# Patient Record
Sex: MALE | Race: WHITE | NOT HISPANIC OR LATINO | Employment: STUDENT | URBAN - METROPOLITAN AREA
[De-identification: names, ages, dates, MRNs, and addresses within clinical notes are randomized per-mention and may not be internally consistent; named-entity substitution may affect disease eponyms.]

---
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
## 2018-07-24 ENCOUNTER — OFFICE VISIT (OUTPATIENT)
Dept: INTERNAL MEDICINE | Facility: CLINIC | Age: 18
End: 2018-07-24
Payer: COMMERCIAL

## 2018-07-24 VITALS
RESPIRATION RATE: 18 BRPM | TEMPERATURE: 98 F | WEIGHT: 199.94 LBS | DIASTOLIC BLOOD PRESSURE: 60 MMHG | HEIGHT: 76 IN | OXYGEN SATURATION: 98 % | SYSTOLIC BLOOD PRESSURE: 116 MMHG | HEART RATE: 60 BPM | BODY MASS INDEX: 24.35 KG/M2

## 2018-07-24 DIAGNOSIS — Z00.00 ANNUAL PHYSICAL EXAM: Primary | ICD-10-CM

## 2018-07-24 DIAGNOSIS — I34.1 MITRAL VALVE PROLAPSE: ICD-10-CM

## 2018-07-24 DIAGNOSIS — Q24.9 ADULT CONGENITAL HEART DISEASE: ICD-10-CM

## 2018-07-24 DIAGNOSIS — Q87.40 MARFAN SYNDROME: ICD-10-CM

## 2018-07-24 PROCEDURE — 99999 PR PBB SHADOW E&M-EST. PATIENT-LVL III: CPT | Mod: PBBFAC,,, | Performed by: INTERNAL MEDICINE

## 2018-07-24 PROCEDURE — 99385 PREV VISIT NEW AGE 18-39: CPT | Mod: S$GLB,,, | Performed by: INTERNAL MEDICINE

## 2018-07-24 NOTE — PROGRESS NOTES
Subjective:       Patient ID: Rodrigo Lopez is a 18 y.o. male.    Chief Complaint: Annual Exam    HPI   18 y.o. Male here for annual exam.     Cholesterol: (needs)  Vaccines: Influenza (declined); Tetanus (2011)  Sexual Screening: declined  Eye exam: 2017    Exercise: cardio/weights 6x/wk  Diet: regular    Past Medical History:  No date: Arachnodactyly  No date: Congenital heart problem  No date: Osteochondritis dessicans  No date: Pectus carinatum  No date: Scoliosis  Past Surgical History:  No date: TYMPANOSTOMY TUBE PLACEMENT  Social History    Marital status: Single              Spouse name:                       Years of education:                 Number of children:               Occupational History  Occupation          Employer            Comment               Student at [employer]                              Social History Main Topics    Smoking status: Never Smoker                                                                Smokeless tobacco: Never Used                        Alcohol use: Yes                Comment: social    Drug use: No              Sexual activity: Yes                  Social History Narrative    Parents .  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  Pledging a fraternity.    Review of patient's allergies indicates:   -- Pistachio nut -- Swelling   -- Cashew nut   Mr. Lopez had no medications administered during this visit.    Review of Systems   Constitutional: Negative for activity change, appetite change, chills, diaphoresis, fatigue, fever and unexpected weight change.   HENT: Negative for congestion, mouth sores, postnasal drip, rhinorrhea, sinus pressure, sneezing, sore throat, trouble swallowing and voice change.    Eyes: Negative for discharge, itching and visual disturbance.   Respiratory: Negative for cough, chest tightness, shortness of breath and wheezing.    Cardiovascular: Negative for chest pain, palpitations and leg swelling.   Gastrointestinal: Negative for  abdominal pain, blood in stool, constipation, diarrhea, nausea and vomiting.   Endocrine: Negative for cold intolerance and heat intolerance.   Genitourinary: Negative for difficulty urinating, dysuria, flank pain, hematuria and urgency.   Musculoskeletal: Negative for arthralgias, back pain, myalgias and neck pain.   Skin: Negative for rash and wound.   Allergic/Immunologic: Negative for environmental allergies and food allergies.   Neurological: Negative for dizziness, tremors, seizures, syncope, weakness and headaches.   Hematological: Negative for adenopathy. Does not bruise/bleed easily.   Psychiatric/Behavioral: Negative for confusion, sleep disturbance and suicidal ideas. The patient is not nervous/anxious.        Objective:      Physical Exam   Constitutional: He is oriented to person, place, and time. He appears well-developed and well-nourished. No distress.   HENT:   Head: Normocephalic and atraumatic.   Right Ear: External ear normal.   Left Ear: External ear normal.   Nose: Nose normal.   Mouth/Throat: Oropharynx is clear and moist. No oropharyngeal exudate.   Eyes: Conjunctivae and EOM are normal. Pupils are equal, round, and reactive to light. Right eye exhibits no discharge. Left eye exhibits no discharge. No scleral icterus.   Neck: Normal range of motion. Neck supple. No JVD present. No thyromegaly present.   Cardiovascular: Normal rate, regular rhythm and intact distal pulses.    Murmur heard.  Pulmonary/Chest: Effort normal and breath sounds normal. No respiratory distress. He has no wheezes. He has no rales.   Abdominal: Soft. Bowel sounds are normal. He exhibits no distension. There is no tenderness. There is no guarding.   Musculoskeletal: He exhibits no edema.   Lymphadenopathy:     He has no cervical adenopathy.   Neurological: He is alert and oriented to person, place, and time. No cranial nerve deficit. Coordination normal.   Skin: Skin is warm and dry. No rash noted. He is not  diaphoretic. No pallor.   Psychiatric: He has a normal mood and affect. Judgment normal.       Assessment:       1. Annual physical exam    2. Marfan syndrome    3. Adult congenital heart disease    4. Mitral valve prolapse        Plan:    1. Blood work ordered       Vaccines: Influenza (declined); Tetanus (2011)       Sexual Screening: declined       Eye exam: 2017   2. Stable   3/4. Stable, CPT

## 2018-07-25 ENCOUNTER — LAB VISIT (OUTPATIENT)
Dept: LAB | Facility: HOSPITAL | Age: 18
End: 2018-07-25
Attending: INTERNAL MEDICINE
Payer: COMMERCIAL

## 2018-07-25 DIAGNOSIS — Z00.00 ANNUAL PHYSICAL EXAM: ICD-10-CM

## 2018-07-25 LAB
ALBUMIN SERPL BCP-MCNC: 3.9 G/DL
ALP SERPL-CCNC: 124 U/L
ALT SERPL W/O P-5'-P-CCNC: 20 U/L
ANION GAP SERPL CALC-SCNC: 6 MMOL/L
AST SERPL-CCNC: 28 U/L
BASOPHILS # BLD AUTO: 0.02 K/UL
BASOPHILS NFR BLD: 0.3 %
BILIRUB SERPL-MCNC: 0.4 MG/DL
BUN SERPL-MCNC: 23 MG/DL
CALCIUM SERPL-MCNC: 9.6 MG/DL
CHLORIDE SERPL-SCNC: 104 MMOL/L
CHOLEST SERPL-MCNC: 163 MG/DL
CHOLEST/HDLC SERPL: 4.2 {RATIO}
CO2 SERPL-SCNC: 28 MMOL/L
CREAT SERPL-MCNC: 1.1 MG/DL
DIFFERENTIAL METHOD: NORMAL
EOSINOPHIL # BLD AUTO: 0.2 K/UL
EOSINOPHIL NFR BLD: 2.5 %
ERYTHROCYTE [DISTWIDTH] IN BLOOD BY AUTOMATED COUNT: 12.5 %
EST. GFR  (AFRICAN AMERICAN): >60 ML/MIN/1.73 M^2
EST. GFR  (NON AFRICAN AMERICAN): >60 ML/MIN/1.73 M^2
GLUCOSE SERPL-MCNC: 93 MG/DL
HCT VFR BLD AUTO: 46 %
HDLC SERPL-MCNC: 39 MG/DL
HDLC SERPL: 23.9 %
HGB BLD-MCNC: 14.9 G/DL
IMM GRANULOCYTES # BLD AUTO: 0.02 K/UL
IMM GRANULOCYTES NFR BLD AUTO: 0.3 %
LDLC SERPL CALC-MCNC: 111 MG/DL
LYMPHOCYTES # BLD AUTO: 3 K/UL
LYMPHOCYTES NFR BLD: 41.9 %
MCH RBC QN AUTO: 28.8 PG
MCHC RBC AUTO-ENTMCNC: 32.4 G/DL
MCV RBC AUTO: 89 FL
MONOCYTES # BLD AUTO: 0.6 K/UL
MONOCYTES NFR BLD: 7.7 %
NEUTROPHILS # BLD AUTO: 3.4 K/UL
NEUTROPHILS NFR BLD: 47.3 %
NONHDLC SERPL-MCNC: 124 MG/DL
NRBC BLD-RTO: 0 /100 WBC
PLATELET # BLD AUTO: 275 K/UL
PMV BLD AUTO: 10.4 FL
POTASSIUM SERPL-SCNC: 5.1 MMOL/L
PROT SERPL-MCNC: 7.5 G/DL
RBC # BLD AUTO: 5.18 M/UL
SODIUM SERPL-SCNC: 138 MMOL/L
TRIGL SERPL-MCNC: 65 MG/DL
TSH SERPL DL<=0.005 MIU/L-ACNC: 1.95 UIU/ML
WBC # BLD AUTO: 7.16 K/UL

## 2018-07-25 PROCEDURE — 86735 MUMPS ANTIBODY: CPT

## 2018-07-25 PROCEDURE — 80061 LIPID PANEL: CPT

## 2018-07-25 PROCEDURE — 86765 RUBEOLA ANTIBODY: CPT

## 2018-07-25 PROCEDURE — 86762 RUBELLA ANTIBODY: CPT

## 2018-07-25 PROCEDURE — 80053 COMPREHEN METABOLIC PANEL: CPT

## 2018-07-25 PROCEDURE — 84443 ASSAY THYROID STIM HORMONE: CPT

## 2018-07-25 PROCEDURE — 36415 COLL VENOUS BLD VENIPUNCTURE: CPT | Mod: PO

## 2018-07-25 PROCEDURE — 85025 COMPLETE CBC W/AUTO DIFF WBC: CPT

## 2018-07-26 LAB
MUMPS IGG INTERPRETATION: NEGATIVE
MUMPS IGG SCREEN: 0.74 ISR
RUBEOLA IGG ANTIBODY: 0.82 ISR
RUBEOLA INTERPRETATION: NEGATIVE
RUBV IGG SER-ACNC: 7.8 IU/ML
RUBV IGG SER-IMP: ABNORMAL

## 2018-07-27 ENCOUNTER — TELEPHONE (OUTPATIENT)
Dept: INTERNAL MEDICINE | Facility: CLINIC | Age: 18
End: 2018-07-27

## 2018-07-27 DIAGNOSIS — Z00.00 ANNUAL PHYSICAL EXAM: Primary | ICD-10-CM

## 2018-07-27 NOTE — TELEPHONE ENCOUNTER
Spoke with pt re: he is not immune to MMR. Pt needs a booster .   Pt verbalized understanding & an appt has been made for it in the follow ing week. Pt is out of town next week.

## 2018-08-08 ENCOUNTER — CLINICAL SUPPORT (OUTPATIENT)
Dept: INTERNAL MEDICINE | Facility: CLINIC | Age: 18
End: 2018-08-08
Payer: COMMERCIAL

## 2018-08-08 PROCEDURE — 90707 MMR VACCINE SC: CPT | Mod: S$GLB,,, | Performed by: INTERNAL MEDICINE

## 2018-08-08 PROCEDURE — 90471 IMMUNIZATION ADMIN: CPT | Mod: S$GLB,,, | Performed by: INTERNAL MEDICINE

## 2018-08-08 NOTE — PROGRESS NOTES
Patient was given MMR booster, to left arm IM, tolerated well. Copy of immunization form given to patient.

## 2018-09-17 RX ORDER — METOPROLOL SUCCINATE 50 MG/1
TABLET, EXTENDED RELEASE ORAL
Qty: 30 TABLET | Refills: 11 | Status: SHIPPED | OUTPATIENT
Start: 2018-09-17 | End: 2019-10-10 | Stop reason: SDUPTHER

## 2019-03-11 DIAGNOSIS — Q24.9 ADULT CONGENITAL HEART DISEASE: Primary | ICD-10-CM

## 2019-03-11 DIAGNOSIS — I34.1 MITRAL VALVE PROLAPSE: ICD-10-CM

## 2019-03-27 ENCOUNTER — HOSPITAL ENCOUNTER (OUTPATIENT)
Dept: CARDIOLOGY | Facility: CLINIC | Age: 19
Discharge: HOME | End: 2019-03-27
Payer: COMMERCIAL

## 2019-03-27 ENCOUNTER — OFFICE VISIT (OUTPATIENT)
Dept: PEDIATRIC CARDIOLOGY | Facility: CLINIC | Age: 19
End: 2019-03-27
Payer: COMMERCIAL

## 2019-03-27 VITALS
WEIGHT: 198 LBS | OXYGEN SATURATION: 100 % | HEART RATE: 63 BPM | HEART RATE: 64 BPM | BODY MASS INDEX: 24.23 KG/M2 | DIASTOLIC BLOOD PRESSURE: 69 MMHG | HEIGHT: 76 IN | HEIGHT: 77 IN | SYSTOLIC BLOOD PRESSURE: 124 MMHG | BODY MASS INDEX: 23.38 KG/M2 | WEIGHT: 199 LBS

## 2019-03-27 DIAGNOSIS — I34.1 MITRAL VALVE PROLAPSE: ICD-10-CM

## 2019-03-27 DIAGNOSIS — Q87.40 MARFAN SYNDROME: Primary | ICD-10-CM

## 2019-03-27 DIAGNOSIS — Q24.9 ADULT CONGENITAL HEART DISEASE: ICD-10-CM

## 2019-03-27 LAB
ASCENDING AORTA: 2.28 CM
AV INDEX (PROSTH): 0.9
AV MEAN GRADIENT: 3.35 MMHG
AV PEAK GRADIENT: 5.02 MMHG
AV VALVE AREA: 3.48 CM2
AV VELOCITY RATIO: 0.92
BSA FOR ECHO PROCEDURE: 2.2 M2
CV ECHO LV RWT: 0.36 CM
DOP CALC AO PEAK VEL: 1.12 M/S
DOP CALC AO VTI: 22.58 CM
DOP CALC LVOT AREA: 3.87 CM2
DOP CALC LVOT DIAMETER: 2.22 CM
DOP CALC LVOT PEAK VEL: 1.03 M/S
DOP CALC LVOT STROKE VOLUME: 78.65 CM3
DOP CALCLVOT PEAK VEL VTI: 20.33 CM
E WAVE DECELERATION TIME: 262.18 MSEC
E/A RATIO: 2.05
E/E' RATIO: 5.71
ECHO LV POSTERIOR WALL: 0.9 CM (ref 0.6–1.1)
FRACTIONAL SHORTENING: 34 % (ref 28–44)
INTERVENTRICULAR SEPTUM: 0.73 CM (ref 0.6–1.1)
LA MAJOR: 4.76 CM
LA MINOR: 4.48 CM
LA WIDTH: 3.48 CM
LEFT ATRIUM SIZE: 3.1 CM
LEFT ATRIUM VOLUME INDEX: 19.1 ML/M2
LEFT ATRIUM VOLUME: 42.33 CM3
LEFT INTERNAL DIMENSION IN SYSTOLE: 3.28 CM (ref 2.1–4)
LEFT VENTRICLE DIASTOLIC VOLUME INDEX: 53.07 ML/M2
LEFT VENTRICLE DIASTOLIC VOLUME: 117.34 ML
LEFT VENTRICLE MASS INDEX: 62.5 G/M2
LEFT VENTRICLE SYSTOLIC VOLUME INDEX: 19.6 ML/M2
LEFT VENTRICLE SYSTOLIC VOLUME: 43.39 ML
LEFT VENTRICULAR INTERNAL DIMENSION IN DIASTOLE: 4.98 CM (ref 3.5–6)
LEFT VENTRICULAR MASS: 138.13 G
LV LATERAL E/E' RATIO: 5.71
LV SEPTAL E/E' RATIO: 5.71
MV PEAK A VEL: 0.39 M/S
MV PEAK E VEL: 0.8 M/S
PISA TR MAX VEL: 1.95 M/S
PV PEAK VELOCITY: 0.89 CM/S
RA MAJOR: 5.48 CM
RA WIDTH: 4.79 CM
RIGHT VENTRICULAR END-DIASTOLIC DIMENSION: 3.52 CM
RV TISSUE DOPPLER FREE WALL SYSTOLIC VELOCITY 1 (APICAL 4 CHAMBER VIEW): 11.85 M/S
SINUS: 3.37 CM
STJ: 2.49 CM
TDI LATERAL: 0.14
TDI SEPTAL: 0.14
TDI: 0.14
TR MAX PG: 15.21 MMHG
TRICUSPID ANNULAR PLANE SYSTOLIC EXCURSION: 2.95 CM

## 2019-03-27 PROCEDURE — 3008F PR BODY MASS INDEX (BMI) DOCUMENTED: ICD-10-PCS | Mod: CPTII,S$GLB,, | Performed by: PEDIATRICS

## 2019-03-27 PROCEDURE — 3008F BODY MASS INDEX DOCD: CPT | Mod: CPTII,S$GLB,, | Performed by: PEDIATRICS

## 2019-03-27 PROCEDURE — 99214 OFFICE O/P EST MOD 30 MIN: CPT | Mod: 25,S$GLB,, | Performed by: PEDIATRICS

## 2019-03-27 PROCEDURE — 99214 PR OFFICE/OUTPT VISIT, EST, LEVL IV, 30-39 MIN: ICD-10-PCS | Mod: 25,S$GLB,, | Performed by: PEDIATRICS

## 2019-03-27 PROCEDURE — 93303 ECHO TRANSTHORACIC: CPT | Mod: S$GLB,,, | Performed by: PEDIATRICS

## 2019-03-27 PROCEDURE — 93000 EKG 12-LEAD: ICD-10-PCS | Mod: S$GLB,,, | Performed by: INTERNAL MEDICINE

## 2019-03-27 PROCEDURE — 93000 ELECTROCARDIOGRAM COMPLETE: CPT | Mod: S$GLB,,, | Performed by: INTERNAL MEDICINE

## 2019-03-27 PROCEDURE — 99999 PR PBB SHADOW E&M-EST. PATIENT-LVL III: ICD-10-PCS | Mod: PBBFAC,,, | Performed by: PEDIATRICS

## 2019-03-27 PROCEDURE — 93303 TRANSTHORACIC ECHO (TTE) COMPLETE (CUPID ONLY): ICD-10-PCS | Mod: S$GLB,,, | Performed by: PEDIATRICS

## 2019-03-27 PROCEDURE — 99999 PR PBB SHADOW E&M-EST. PATIENT-LVL III: CPT | Mod: PBBFAC,,, | Performed by: PEDIATRICS

## 2019-03-27 NOTE — PROGRESS NOTES
2019    Re:Rodrigo Lopez  :2000    Jono Tesfaye, DO   [facility] [place] [address]    [facility]    Dear Rasta:    It is a pleasure to see Rodrigo in follow up for his Marfan syndrome in the [facility].  The history was provided by the patient and his father.  Rodrigo Lopez is a 18 y.o. male diagnosed via genetic testing with Marfan syndrome.  He is asymptomatic from a cardiovascular standpoint without chest pain, palpitations, syncope, near-syncope, cyanosis, and edema.  He is no longer playing basketball and is well aware of the activity restrictions placed on patients with Marfan's.  He continues to work out regularly, lifting weights that he can comfortably lift 10 times and playing pickup basketball.  He has seen opthalmology, and his eyes are doing well.  He is tolerating his beta blocker.    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  FBN1 was positive for C271X pathogenic mutation consistent with Marfan syndrome consistent with Marfan syndrome.    Past Surgical History:   Procedure Laterality Date    TYMPANOSTOMY TUBE PLACEMENT       Family History   Problem Relation Age of Onset    Diabetes Maternal Grandmother     Depression Maternal Grandmother     Short stature Brother     Depression Mother     Depression Maternal Aunt     Diabetes Maternal Aunt     Congenital heart disease Neg Hx    There is no family history of aortic dissection, sudden death, or Marfan syndrome.  Social History     Socioeconomic History    Marital status: Single     Spouse name: None    Number of children: None    Years of education: None    Highest education level: None   Occupational History    Occupation: Student at [employer]   Social Needs    Financial  "resource strain: None    Food insecurity:     Worry: None     Inability: None    Transportation needs:     Medical: None     Non-medical: None   Tobacco Use    Smoking status: Never Smoker    Smokeless tobacco: Never Used   Substance and Sexual Activity    Alcohol use: Yes     Comment: social    Drug use: No    Sexual activity: Yes   Lifestyle    Physical activity:     Days per week: None     Minutes per session: None    Stress: None   Relationships    Social connections:     Talks on phone: None     Gets together: None     Attends Confucianism service: None     Active member of club or organization: None     Attends meetings of clubs or organizations: None     Relationship status: None    Intimate partner violence:     Fear of current or ex partner: None     Emotionally abused: None     Physically abused: None     Forced sexual activity: None   Other Topics Concern    None   Social History Narrative    Parents .  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  Pledging a fraternity.       Current Outpatient Medications:     metoprolol succinate (TOPROL-XL) 50 MG 24 hr tablet, TAKE 1 TABLET(50 MG) BY MOUTH EVERY DAY, Disp: 30 tablet, Rfl: 11  Allergies   Allergen Reactions    Cashew Nut      The review of systems is as noted above. It is otherwise negative for other symptoms related to the general, neurological, psychiatric, endocrine, gastrointestinal, genitourinary, respiratory, dermatologic, musculoskeletal, hematologic, and immunologic systems.    /69 (BP Location: Left arm, Patient Position: Sitting)   Pulse 64   Ht 6' 5" (1.956 m)   Wt 89.8 kg (197 lb 15.6 oz)   SpO2 100%   BMI 23.48 kg/m²   Wt Readings from Last 3 Encounters:   03/27/19 89.8 kg (197 lb 15.6 oz)   03/27/19 90.3 kg (199 lb)   07/24/18 90.7 kg (199 lb 15.3 oz)     Ht Readings from Last 3 Encounters:   03/27/19 6' 5" (1.956 m)   03/27/19 6' 4" (1.93 m)   07/24/18 6' 4" (1.93 m)     Body mass index is 23.48 " kg/m².  [unfilled]  Facility age limit for growth percentiles is 20 years.  Facility age limit for growth percentiles is 20 years.  In general, he is a tall thin white male in no apparent distress.  The head is normocephalic and atraumatic.  The eyes and ears are clear.  There is no significant teeth crowding but he does have a somewhat high arched palate.  The neck is supple.  There is no jugular venous distention.  The lungs are clear to auscultation bilaterally.  There may be a very mild scoliosis.  He has a very mild pectus.  The first and second heart sounds are normal.  A mid systolic click is appreciated.  However, there are no murmurs in the supine or standing position.  The abdominal exam is benign without hepatosplenomegaly, tenderness, or distention.  Pulses are normal in all extremities with brisk capillary refill and no clubbing, cyanosis, or edema.  He has arachnodactyly.  He has very long arms and legs.    I personally reviewed the following tests performed today and my interpretation follows:  EKG today is normal.  Rate of 64.    Echo today is normal except for borderline MVP with trivial MR.  The aortic root is not enlarged at all (3.4 at sinuses, ascending aorta around 2.4).    He had a cardiac MRI December 19, 2017:  On angiogram:  a. Sinuses of valsalva: [id number] (Z-score 0.7 via [email or web address])  b. Sinotubular junction: 24x23 mm (Z-score -0.57 Josie data)  c. Ascending aorta at the level of the RPA: [id number] (Z-score -0.42 Josie data)  d. Ascending aorta prior the right brachiocephalic: 22x21 mm  e. Proximal transverse arch: 29k55qi  f. Distal transverse arch: 53b22sk  g. Descending aortic arch just after the left subclavian (isthmus):05x55ri  h. Descending aorta at the left PA: 23x17 mm  i. Descending aorta at the diaphragm: 18x16 mm    Conclusion:    1. Normal LV volumes with LVEF of 56%  2. Normal RV volumes with RVEF of 38%  3. Trileaflet AV without significant AI  4. Normal size aorta   (measurements as described above).   5. Normal mitral valve    Diagnoses:  1.  Genetically confirmed Marfan syndrome  2.  Mitral valve prolapse without significant insufficiency  3.  No evidence of aortic root enlargement on echo today or MRI 12/2017    Discussion:  We once again had a long discussion about the potential cardiovascular problems associated with Marfan syndrome.  The most worrisome is the increased risk of progressive aortic root enlargement associated with aortic dissection and aortic rupture.  As of a year ago, his aortic root looked completely normal.  However, secondary to published recommendations, we started his beta blocker a few years ago, and he has tolerated this well.  Any chest pain should be evaluated immediately.  Although his risk for aortic dissection is quite low given the normal echocardiogram, he should be evaluated immediately with chest pain and had an echocardiogram or CT scan.    His mitral valve prolapse is quite mild and there is no insufficiency.  I think it is unlikely that he will ever require intervention on the mitral valve.    We again had a discussion about activity restriction related to Marfan syndrome.  Given the normal aorta, I have not restricted him from light weight lifting (able to lift greater than 10 reps, no straining), but we will likely need to restrict more in the future if we see any dilation of the aorta.    Plan:  1.  As above  2.  No SBEP needed.  3.  Continue metoprolol 50 mg daily.  4.  Follow up 1 year with echo and ekg.  Repeat MRI in 2 years.    Thank you for referring this patient to our clinic.  Please call with any questions.    Sincerely,        Gianni Salazar MD  Pediatric Cardiology  Adult Congenital Heart Disease  Pediatric Heart Failure and Transplantation  [facility] [address] [place]  [address]  [phone]

## 2019-10-10 RX ORDER — METOPROLOL SUCCINATE 50 MG/1
TABLET, EXTENDED RELEASE ORAL
Qty: 30 TABLET | Refills: 11 | Status: SHIPPED | OUTPATIENT
Start: 2019-10-10 | End: 2020-09-10 | Stop reason: SDUPTHER

## 2020-08-17 DIAGNOSIS — Q87.40 MARFAN SYNDROME: ICD-10-CM

## 2020-08-17 DIAGNOSIS — I34.1 MITRAL VALVE PROLAPSE: Primary | ICD-10-CM

## 2020-08-17 DIAGNOSIS — Q24.9 ADULT CONGENITAL HEART DISEASE: ICD-10-CM

## 2020-09-10 ENCOUNTER — HOSPITAL ENCOUNTER (OUTPATIENT)
Dept: CARDIOLOGY | Facility: CLINIC | Age: 20
Discharge: HOME | End: 2020-09-10
Payer: COMMERCIAL

## 2020-09-10 ENCOUNTER — HOSPITAL ENCOUNTER (OUTPATIENT)
Dept: CARDIOLOGY | Facility: HOSPITAL | Age: 20
Discharge: HOME | End: 2020-09-10
Attending: PEDIATRICS
Payer: COMMERCIAL

## 2020-09-10 ENCOUNTER — OFFICE VISIT (OUTPATIENT)
Dept: CARDIOLOGY | Facility: CLINIC | Age: 20
End: 2020-09-10
Payer: COMMERCIAL

## 2020-09-10 VITALS
SYSTOLIC BLOOD PRESSURE: 121 MMHG | WEIGHT: 197 LBS | DIASTOLIC BLOOD PRESSURE: 71 MMHG | HEIGHT: 77 IN | HEART RATE: 66 BPM | WEIGHT: 204.81 LBS | HEART RATE: 66 BPM | BODY MASS INDEX: 23.26 KG/M2 | BODY MASS INDEX: 24.18 KG/M2 | OXYGEN SATURATION: 98 % | HEIGHT: 77 IN

## 2020-09-10 DIAGNOSIS — Q87.40 MARFAN SYNDROME: ICD-10-CM

## 2020-09-10 DIAGNOSIS — Q24.9 ADULT CONGENITAL HEART DISEASE: ICD-10-CM

## 2020-09-10 DIAGNOSIS — I34.1 MITRAL VALVE PROLAPSE: ICD-10-CM

## 2020-09-10 DIAGNOSIS — Q24.9 CONGENITAL MALFORMATION OF HEART, UNSPECIFIED: ICD-10-CM

## 2020-09-10 DIAGNOSIS — Q24.9 ADULT CONGENITAL HEART DISEASE: Primary | ICD-10-CM

## 2020-09-10 LAB
ASCENDING AORTA: 2.41 CM
AV INDEX (PROSTH): 1.04
AV MEAN GRADIENT: 2 MMHG
AV PEAK GRADIENT: 4 MMHG
AV VALVE AREA: 4.84 CM2
AV VELOCITY RATIO: 1.01
BSA FOR ECHO PROCEDURE: 2.2 M2
CV ECHO LV RWT: 0.33 CM
DOP CALC AO PEAK VEL: 0.96 M/S
DOP CALC AO VTI: 18.87 CM
DOP CALC LVOT AREA: 4.6 CM2
DOP CALC LVOT DIAMETER: 2.43 CM
DOP CALC LVOT PEAK VEL: 0.97 M/S
DOP CALC LVOT STROKE VOLUME: 91.27 CM3
DOP CALC RVOT PEAK VEL: 0.88 M/S
DOP CALC RVOT VTI: 17.83 CM
DOP CALCLVOT PEAK VEL VTI: 19.69 CM
E WAVE DECELERATION TIME: 271.02 MSEC
E/A RATIO: 2.11
E/E' RATIO: 6.17 M/S
ECHO LV POSTERIOR WALL: 0.84 CM (ref 0.6–1.1)
FRACTIONAL SHORTENING: 34 % (ref 28–44)
INTERVENTRICULAR SEPTUM: 0.8 CM (ref 0.6–1.1)
LA MAJOR: 5.47 CM
LA MINOR: 5 CM
LA WIDTH: 3.35 CM
LEFT ATRIUM SIZE: 2.82 CM
LEFT ATRIUM VOLUME INDEX: 18.6 ML/M2
LEFT ATRIUM VOLUME: 41.95 CM3
LEFT INTERNAL DIMENSION IN SYSTOLE: 3.43 CM (ref 2.1–4)
LEFT VENTRICLE DIASTOLIC VOLUME INDEX: 56.32 ML/M2
LEFT VENTRICLE DIASTOLIC VOLUME: 127.24 ML
LEFT VENTRICLE MASS INDEX: 65 G/M2
LEFT VENTRICLE SYSTOLIC VOLUME INDEX: 21.4 ML/M2
LEFT VENTRICLE SYSTOLIC VOLUME: 48.28 ML
LEFT VENTRICULAR INTERNAL DIMENSION IN DIASTOLE: 5.16 CM (ref 3.5–6)
LEFT VENTRICULAR MASS: 147.9 G
LV LATERAL E/E' RATIO: 5.69 M/S
LV SEPTAL E/E' RATIO: 6.73 M/S
MV PEAK A VEL: 0.35 M/S
MV PEAK E VEL: 0.74 M/S
MV STENOSIS PRESSURE HALF TIME: 78.59 MS
MV VALVE AREA P 1/2 METHOD: 2.8 CM2
PISA TR MAX VEL: 1.91 M/S
PV MEAN GRADIENT: 1.87 MMHG
PV PEAK VELOCITY: 0.92 CM/S
RA MAJOR: 5.7 CM
RA WIDTH: 4.11 CM
RIGHT VENTRICULAR END-DIASTOLIC DIMENSION: 3.49 CM
RV TISSUE DOPPLER FREE WALL SYSTOLIC VELOCITY 1 (APICAL 4 CHAMBER VIEW): 13.99 CM/S
SINUS: 3.5 CM
STJ: 3.11 CM
TDI LATERAL: 0.13 M/S
TDI SEPTAL: 0.11 M/S
TDI: 0.12 M/S
TR MAX PG: 15 MMHG
TRICUSPID ANNULAR PLANE SYSTOLIC EXCURSION: 2.92 CM

## 2020-09-10 PROCEDURE — 93010 ELECTROCARDIOGRAM REPORT: CPT | Mod: S$GLB,,, | Performed by: INTERNAL MEDICINE

## 2020-09-10 PROCEDURE — 93320 DOPPLER ECHO COMPLETE: CPT | Mod: 26,,, | Performed by: PEDIATRICS

## 2020-09-10 PROCEDURE — 99214 PR OFFICE/OUTPT VISIT, EST, LEVL IV, 30-39 MIN: ICD-10-PCS | Mod: S$GLB,,, | Performed by: PEDIATRICS

## 2020-09-10 PROCEDURE — 99999 PR PBB SHADOW E&M-EST. PATIENT-LVL IV: CPT | Mod: PBBFAC,,, | Performed by: PEDIATRICS

## 2020-09-10 PROCEDURE — 93325 ECHO (CUPID ONLY): ICD-10-PCS | Mod: 26,,, | Performed by: PEDIATRICS

## 2020-09-10 PROCEDURE — 93303 ECHO TRANSTHORACIC: CPT

## 2020-09-10 PROCEDURE — 93303 ECHO TRANSTHORACIC: CPT | Mod: 26,,, | Performed by: PEDIATRICS

## 2020-09-10 PROCEDURE — 99999 PR PBB SHADOW E&M-EST. PATIENT-LVL IV: ICD-10-PCS | Mod: PBBFAC,,, | Performed by: PEDIATRICS

## 2020-09-10 PROCEDURE — 93005 EKG 12-LEAD PEDIATRIC: ICD-10-PCS | Mod: S$GLB,,, | Performed by: PEDIATRICS

## 2020-09-10 PROCEDURE — 3008F BODY MASS INDEX DOCD: CPT | Mod: CPTII,S$GLB,, | Performed by: PEDIATRICS

## 2020-09-10 PROCEDURE — 93320 ECHO (CUPID ONLY): ICD-10-PCS | Mod: 26,,, | Performed by: PEDIATRICS

## 2020-09-10 PROCEDURE — 99214 OFFICE O/P EST MOD 30 MIN: CPT | Mod: S$GLB,,, | Performed by: PEDIATRICS

## 2020-09-10 PROCEDURE — 93325 DOPPLER ECHO COLOR FLOW MAPG: CPT | Mod: 26,,, | Performed by: PEDIATRICS

## 2020-09-10 PROCEDURE — 93010 EKG 12-LEAD PEDIATRIC: ICD-10-PCS | Mod: S$GLB,,, | Performed by: INTERNAL MEDICINE

## 2020-09-10 PROCEDURE — 93303 ECHO (CUPID ONLY): ICD-10-PCS | Mod: 26,,, | Performed by: PEDIATRICS

## 2020-09-10 PROCEDURE — 3008F PR BODY MASS INDEX (BMI) DOCUMENTED: ICD-10-PCS | Mod: CPTII,S$GLB,, | Performed by: PEDIATRICS

## 2020-09-10 PROCEDURE — 93005 ELECTROCARDIOGRAM TRACING: CPT | Mod: S$GLB,,, | Performed by: PEDIATRICS

## 2020-09-10 NOTE — PROGRESS NOTES
09/10/2020    Re:Rodrigo Lopez  :2000    Jono Tesfaye, DO   [facility] [place] [address]    [facility]    Dear Rasta:    It is a pleasure to see Rodrigo in follow up for his Marfan syndrome in the [facility].  The history was provided by the patient and his father.  Rodrigo Lopez is a 20 y.o. male diagnosed via genetic testing with Marfan syndrome.  He is asymptomatic from a cardiovascular standpoint without chest pain, palpitations, syncope, near-syncope, cyanosis, and edema.  He is well aware of the activity restrictions placed on patients with Marfan's.  He continues to work out regularly, lifting weights that he can comfortably lift 10 times and playing pickup basketball.  He has seen opthalmology, and his eyes are doing well.  He is tolerating his beta blocker, and he reports good compliance.    He is now a senior at [employer].  He is studying finance.  He has a job set up in [place] next year.    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  FBN1 was positive for C271X pathogenic mutation consistent with Marfan syndrome consistent with Marfan syndrome.    Past Surgical History:   Procedure Laterality Date    TYMPANOSTOMY TUBE PLACEMENT       Family History   Problem Relation Age of Onset    Diabetes Maternal Grandmother     Depression Maternal Grandmother     Short stature Brother     Depression Mother     Depression Maternal Aunt     Diabetes Maternal Aunt     Congenital heart disease Neg Hx     Cardiomyopathy Neg Hx    There is no family history of aortic dissection, sudden death, or Marfan syndrome.  Social History     Socioeconomic History    Marital status: Single     Spouse name: Not on file    Number of children: Not on file    Years  "of education: Not on file    Highest education level: Not on file   Occupational History    Occupation: Student at [employer]   Social Needs    Financial resource strain: Not on file    Food insecurity     Worry: Not on file     Inability: Not on file    Transportation needs     Medical: Not on file     Non-medical: Not on file   Tobacco Use    Smoking status: Never Smoker    Smokeless tobacco: Never Used   Substance and Sexual Activity    Alcohol use: Yes     Comment: social    Drug use: No    Sexual activity: Yes   Lifestyle    Physical activity     Days per week: Not on file     Minutes per session: Not on file    Stress: Not on file   Relationships    Social connections     Talks on phone: Not on file     Gets together: Not on file     Attends Tenriism service: Not on file     Active member of club or organization: Not on file     Attends meetings of clubs or organizations: Not on file     Relationship status: Not on file   Other Topics Concern    Not on file   Social History Narrative    Parents .  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         Current Outpatient Medications:     metoprolol succinate (TOPROL-XL) 50 MG 24 hr tablet, Take 1 tablet (50 mg total) by mouth once daily., Disp: 30 tablet, Rfl: 11  Allergies   Allergen Reactions    Cashew Nut      The review of systems is as noted above. It is otherwise negative for other symptoms related to the general, neurological, psychiatric, endocrine, gastrointestinal, genitourinary, respiratory, dermatologic, musculoskeletal, hematologic, and immunologic systems.    /71 (BP Location: Left arm, Patient Position: Sitting, BP Method: Large (Automatic))   Pulse 66   Ht 6' 5" (1.956 m)   Wt 92.9 kg (204 lb 12.9 oz)   SpO2 98%   BMI 24.29 kg/m²   Wt Readings from Last 3 Encounters:   09/10/20 92.9 kg (204 lb 12.9 oz)   09/10/20 89.4 kg (197 lb)   03/27/19 90.3 kg (199 lb)     Ht Readings from Last 3 Encounters:   09/10/20 6' 5" " "(1.956 m)   09/10/20 6' 5" (1.956 m)   03/27/19 6' 4" (1.93 m)     Body mass index is 24.29 kg/m².  [unfilled]  Facility age limit for growth percentiles is 20 years.  Facility age limit for growth percentiles is 20 years.  In general, he is a tall thin white male in no apparent distress.  The head is normocephalic and atraumatic.  The eyes and ears are clear.  There is no significant teeth crowding but he does have a somewhat high arched palate.  The neck is supple.  There is no jugular venous distention.  The lungs are clear to auscultation bilaterally.  There may be a very mild scoliosis.  He has a very mild pectus.  The first and second heart sounds are normal.  A mid systolic click is appreciated.  However, there are no murmurs in the supine or standing position.  The abdominal exam is benign without hepatosplenomegaly, tenderness, or distention.  Pulses are normal in all extremities with brisk capillary refill and no clubbing, cyanosis, or edema.  He has arachnodactyly.  He has very long arms and legs.    I personally reviewed the following tests performed today and my interpretation follows:  EKG today is normal.  Rate of 66.    Echo today reveals:  History of Marfan's disease with no obvious change compared to previous study-  Qualitatively normal right ventricular size structure with good systolic function.  Mildly redundant anterior leafet of the mitral valve with trivial prolapse and no evidence of insufficiency or stenosis.  Normal left ventricular size and structure.  Normal septal motion with good movement of the LV free wall, SF = 34 % and EF estimated 60-65 % from apical views.   Normal indices of left ventricular diastolic function.  Aortic dimensions:  Sinuses of Valsalva = 35 mm.  ST junction             = 31 mm.  Ascending aorta     = 25 mm.      He had a cardiac MRI December 19, 2017:  On angiogram:  a. Sinuses of valsalva: [id number] (Z-score 0.7 via [email or web address])  b. Sinotubular junction: 24x23 mm " (Z-score -0.57 Josie data)  c. Ascending aorta at the level of the RPA: [id number] (Z-score -0.42 Josie data)  d. Ascending aorta prior the right brachiocephalic: 22x21 mm  e. Proximal transverse arch: 67x07vo  f. Distal transverse arch: 16b06jl  g. Descending aortic arch just after the left subclavian (isthmus):49m58tk  h. Descending aorta at the left PA: 23x17 mm  i. Descending aorta at the diaphragm: 18x16 mm    Conclusion:    1. Normal LV volumes with LVEF of 56%  2. Normal RV volumes with RVEF of 38%  3. Trileaflet AV without significant AI  4. Normal size aorta  (measurements as described above).   5. Normal mitral valve    Diagnoses:  1.  Genetically confirmed Marfan syndrome  2.  Mitral valve prolapse without significant insufficiency  3.  No evidence of aortic root enlargement on echo today or MRI 12/2017    Discussion:  We once again had a long discussion about the potential cardiovascular problems associated with Marfan syndrome.  The most worrisome is the increased risk of progressive aortic root enlargement associated with aortic dissection and aortic rupture.  His aorta continues to look completely normal.  However, secondary to published recommendations, we started his beta blocker a few years ago, and he has tolerated this well.  Any chest pain should be evaluated immediately.      His mitral valve prolapse is quite mild and there is no significant insufficiency.  I think it is unlikely that he will ever require intervention on the mitral valve.    We again had a discussion about activity restriction related to Marfan syndrome.  Given the normal aorta, I have not restricted him from light weight lifting (able to lift greater than 10 reps, no straining), but we will likely need to restrict more in the future if we see any dilation of the aorta.    Plan:  1.  As above  2.  No SBEP needed.  3.  Continue metoprolol 50 mg daily.  4.  Follow up 1 year with EKG and cardiac MRI.    Thank you for referring this  patient to our clinic.  Please call with any questions.    Sincerely,        Gianni Salazar MD  Pediatric Cardiology  Adult Congenital Heart Disease  Pediatric Heart Failure and Transplantation  [facility] [address] [place]  [address]  [phone]

## 2020-10-05 ENCOUNTER — PATIENT MESSAGE (OUTPATIENT)
Dept: ADMINISTRATIVE | Facility: HOSPITAL | Age: 20
End: 2020-10-05

## 2020-11-03 ENCOUNTER — OFFICE VISIT (OUTPATIENT)
Dept: URGENT CARE | Facility: CLINIC | Age: 20
End: 2020-11-03
Payer: COMMERCIAL

## 2020-11-03 VITALS
DIASTOLIC BLOOD PRESSURE: 82 MMHG | BODY MASS INDEX: 23.62 KG/M2 | SYSTOLIC BLOOD PRESSURE: 125 MMHG | TEMPERATURE: 98 F | HEIGHT: 77 IN | WEIGHT: 200 LBS | HEART RATE: 76 BPM | OXYGEN SATURATION: 96 %

## 2020-11-03 DIAGNOSIS — R10.11 RIGHT UPPER QUADRANT PAIN: Primary | ICD-10-CM

## 2020-11-03 DIAGNOSIS — R10.9 FLANK PAIN, ACUTE: ICD-10-CM

## 2020-11-03 LAB
BILIRUB UR QL STRIP: NEGATIVE
GLUCOSE UR QL STRIP: NEGATIVE
KETONES UR QL STRIP: NEGATIVE
LEUKOCYTE ESTERASE UR QL STRIP: NEGATIVE
PH, POC UA: 7 (ref 5–8)
POC BLOOD, URINE: NEGATIVE
POC NITRATES, URINE: NEGATIVE
PROT UR QL STRIP: NEGATIVE
SP GR UR STRIP: 1.01 (ref 1–1.03)
UROBILINOGEN UR STRIP-ACNC: NORMAL (ref 0.3–2.2)

## 2020-11-03 PROCEDURE — 74018 RADEX ABDOMEN 1 VIEW: CPT | Mod: FY,S$GLB,, | Performed by: RADIOLOGY

## 2020-11-03 PROCEDURE — 74018 XR KUB: ICD-10-PCS | Mod: FY,S$GLB,, | Performed by: RADIOLOGY

## 2020-11-03 PROCEDURE — 81003 URINALYSIS AUTO W/O SCOPE: CPT | Mod: QW,S$GLB,, | Performed by: NURSE PRACTITIONER

## 2020-11-03 PROCEDURE — 99213 OFFICE O/P EST LOW 20 MIN: CPT | Mod: 25,S$GLB,, | Performed by: NURSE PRACTITIONER

## 2020-11-03 PROCEDURE — 99213 PR OFFICE/OUTPT VISIT, EST, LEVL III, 20-29 MIN: ICD-10-PCS | Mod: 25,S$GLB,, | Performed by: NURSE PRACTITIONER

## 2020-11-03 PROCEDURE — 81003 POCT URINALYSIS, DIPSTICK, AUTOMATED, W/O SCOPE: ICD-10-PCS | Mod: QW,S$GLB,, | Performed by: NURSE PRACTITIONER

## 2020-11-03 NOTE — PROGRESS NOTES
"Subjective:       Patient ID: Rodrigo Lopez is a 20 y.o. male.    Vitals:  height is 6' 5" (1.956 m) and weight is 90.7 kg (200 lb). His temperature is 97.9 °F (36.6 °C). His blood pressure is 125/82 and his pulse is 76. His oxygen saturation is 96%.     Chief Complaint: Flank Pain    Pt states pain in R upper abdomen. Symptoms began 10/27; progressively has worsened. Pt states pain does not radiate.  Comes and goes. Not currently experiencing pain.  Not associated with activity.  Last episode was this morning. 6 days ago the pain was very severe-- occurred about 1-2 hours after eating 2 large plates of red beans and rice lasted about an hour. No fever. No sob. No CP.     Flank Pain  This is a new problem. The current episode started in the past 7 days (10/27). The problem is unchanged. The pain is present in the sacro-iliac. The quality of the pain is described as cramping and stabbing. The pain does not radiate. The pain is at a severity of 4/10. The pain is moderate. The pain is the same all the time. The symptoms are aggravated by lying down. Associated symptoms include abdominal pain. Pertinent negatives include no bladder incontinence, bowel incontinence, chest pain, dysuria, fever, headaches, leg pain, numbness, paresis, paresthesias, pelvic pain, perianal numbness, tingling, weakness or weight loss. He has tried nothing for the symptoms.       Constitution: Negative for chills, fatigue and fever.   HENT: Negative for congestion and sore throat.    Neck: Negative for painful lymph nodes.   Cardiovascular: Negative for chest pain, leg swelling, palpitations and sob on exertion.   Eyes: Negative for double vision and blurred vision.   Respiratory: Negative for cough and shortness of breath.    Gastrointestinal: Positive for abdominal pain. Negative for history of abdominal surgery, nausea, vomiting, constipation, diarrhea, bright red blood in stool and bowel incontinence.   Genitourinary: Negative for dysuria, " frequency, urgency, flank pain, bladder incontinence, hematuria and pelvic pain.   Musculoskeletal: Negative for joint pain, joint swelling, muscle cramps and muscle ache.   Skin: Negative for color change, pale and rash.   Allergic/Immunologic: Negative for seasonal allergies.   Neurological: Negative for dizziness, history of vertigo, light-headedness, passing out, headaches and numbness.   Hematologic/Lymphatic: Negative for swollen lymph nodes, easy bruising/bleeding and history of blood clots. Does not bruise/bleed easily.   Psychiatric/Behavioral: Negative for nervous/anxious, sleep disturbance and depression. The patient is not nervous/anxious.        Objective:      Physical Exam   Constitutional: He is oriented to person, place, and time. He appears well-developed.   HENT:   Head: Normocephalic and atraumatic.   Ears:   Right Ear: External ear normal.   Left Ear: External ear normal.   Nose: Nose normal.   Mouth/Throat: Mucous membranes are normal.   Eyes: Conjunctivae and lids are normal.   Neck: Trachea normal and full passive range of motion without pain. Neck supple.   Cardiovascular: Normal rate, regular rhythm and normal heart sounds.   Pulmonary/Chest: Effort normal and breath sounds normal. No respiratory distress.   Abdominal: Soft. Normal appearance and bowel sounds are normal. He exhibits no distension, no abdominal bruit, no pulsatile midline mass and no mass. There is no abdominal tenderness. There is no rebound, no guarding, no left CVA tenderness and no right CVA tenderness. flat abdomen  Musculoskeletal: Normal range of motion.   Neurological: He is alert and oriented to person, place, and time. He has normal strength.   Skin: Skin is warm, dry, intact, not diaphoretic and not pale. Psychiatric: His speech is normal and behavior is normal. Judgment and thought content normal.   Nursing note and vitals reviewed.        Results for orders placed or performed in visit on 11/03/20   POCT  Urinalysis, Dipstick, Automated, W/O Scope   Result Value Ref Range    POC Blood, Urine Negative Negative    POC Bilirubin, Urine Negative Negative    POC Urobilinogen, Urine norm 0.3 - 2.2    POC Ketones, Urine Negative Negative    POC Protein, Urine Negative Negative    POC Nitrates, Urine Negative Negative    POC Glucose, Urine Negative Negative    pH, UA 7.0 5 - 8    POC Specific Gravity, Urine 1.010 1.003 - 1.029    POC Leukocytes, Urine Negative Negative     Xr Kub    Result Date: 11/3/2020  EXAMINATION: XR KUB CLINICAL HISTORY: Unspecified abdominal pain TECHNIQUE: Single AP supine view of the abdomen (KUB) was performed COMPARISON: None FINDINGS: Nonobstructive bowel gas pattern.  No organomegaly or significant mass effect.  No large amount of free air or abnormal intra-abdominal calcification seen allowing for noninclusion of the uppermost abdomen and lung bases.  Included osseous structures appear intact.     Nonobstructive bowel gas pattern. Electronically signed by: Jono Huffman MD Date:    11/03/2020 Time:    16:38    Assessment:       1. Right upper quadrant pain    2. Flank pain, acute        Plan:         Right upper quadrant pain  -     XR KUB; Future; Expected date: 11/03/2020    Flank pain, acute  -     POCT Urinalysis, Dipstick, Automated, W/O Scope  -     XR KUB; Future; Expected date: 11/03/2020

## 2020-11-03 NOTE — PATIENT INSTRUCTIONS
Return to Urgent Care or go to ER if symptoms worsen or fail to improve.  Follow up with PCP as recommended for further management.   Try antigas OTC medication such as mylanta, according to label instructions.     Uncertain Causes of Abdominal Pain (Male)  Based on your visit today, the exact cause of your abdominal pain is not clear. Your exam and tests do not suggest a dangerous cause at this time. However, the signs of a serious problem may take more time to appear. Although your evaluation was reassuring today, sometimes early in the course of many conditions, exam and lab tests can appear normal. Therefore, it is important for you to watch for any new symptoms or worsening of your condition.  It may not be obvious what caused your symptoms. Pay attention to things that do seem to make your symptoms worse or better and discuss this with your doctor when you follow up.  The evaluation of abdominal pain in the emergency department may only require an exam by the doctor or it may include blood, urine or imaging studies, depending on many factors. Sometimes exams and tests can identify a cause but in many cases, a clear cause is not found. Further testing at follow up visits may help to suggest a clear diagnosis.  Home care  · Rest as much as you can until your next exam.  · Try to avoid any medicines (unless otherwise directed by your doctor), foods, activities, or other factors that may have contributed to your symptoms.  · Try to eat foods that you know that you have tolerated well in the past. Certain diets may be recommended for some conditions that cause abdominal pain. However, since the cause of your symptoms may not be clear, discuss your diet more with your healthcare provider or specialist for further recommendations.   · If you have diarrhea, it may help to avoid dairy (lactose) for the time being. A low fat, low fiber diet can also help.  · Eating several small meals per day as opposed to 2 or 3 larger  meals may help.  · Avoid dehydration. Make sure to drink plenty of water. Other options include broth, soup, gelatin, sports drinks, or other clear liquids.  · Watch closely for anything that may make your symptoms worse or better. Pay close attention to symptoms below that may mean your condition is getting worse.  Follow-up care  Follow up with your healthcare provider if your symptoms are not improving, or as advised. In some cases, you may need more testing.  When to seek medical advice  Call your healthcare provider right away if any of these occur:  · Pain is becoming worse  · You are unable to take your medicines or can't keep water down due to excessive vomiting  · Swelling of the abdomen  · Fever of 100.4ºF (38ºC) or higher, or as directed by your healthcare provider  · Blood in vomit or bowel movements (dark red or black color)  · Jaundice (yellow color of eyes and skin)  · New onset of weakness, dizziness or fainting  · New onset of chest, arm, back, neck or jaw pain  Date Last Reviewed: 12/30/2015  © 0003-0332 [employer]. [address] [place] [address]. All rights reserved. This information is not intended as a substitute for professional medical care. Always follow your healthcare professional's instructions.        Abdominal Pain    Abdominal pain is pain in the stomach or belly area. Everyone has this pain from time to time. In many cases it goes away on its own. But abdominal pain can sometimes be due to a serious problem, such as appendicitis. So its important to know when to seek help.  Causes of abdominal pain  There are many possible causes of abdominal pain. Common causes in adults include:  · Constipation, diarrhea, or gas  · Stomach acid flowing back up into the esophagus (acid reflux or heartburn)  · Severe acid reflux, called GERD (gastroesophageal reflux disease)  · A sore in the lining of the stomach or small intestine (peptic ulcer)  · Inflammation of the  gallbladder, liver, or pancreas  · Gallstones or kidney stones  · Appendicitis   · Intestinal blockage   · An internal organ pushing through a muscle or other tissue (hernia)  · Urinary tract infections  · In women, menstrual cramps, fibroids, or endometriosis  · Inflammation or infection of the intestines  Diagnosing the cause of abdominal pain  Your healthcare provider will do a physical exam help find the cause of your pain. If needed, tests will be ordered. Belly pain has many possible causes. So it can be hard to find the reason for your pain. Giving details about your pain can help. Tell your provider where and when you feel the pain, and what makes it better or worse. Also let your provider know if you have other symptoms such as:  · Fever  · Tiredness  · Upset stomach (nausea)  · Vomiting  · Changes in bathroom habits  Treating abdominal pain  Some causes of pain need emergency medical treatment right away. These include appendicitis or a bowel blockage. Other problems can be treated with rest, fluids, or medicines. Your healthcare provider can give you specific instructions for treatment or self-care based on what is causing your pain.  If you have vomiting or diarrhea, sip water or other clear fluids. When you are ready to eat solid foods again, start with small amounts of easy-to-digest, low-fat foods. These include apple sauce, toast, or crackers.   When to seek medical care  Call 911 or go to the hospital right away if you:  · Cant pass stool and are vomiting  · Are vomiting blood or have bloody diarrhea or black, tarry diarrhea  · Have chest, neck, or shoulder pain  · Feel like you might pass out  · Have pain in your shoulder blades with nausea  · Have sudden, severe belly pain  · Have new, severe pain unlike any you have felt before  · Have a belly that is rigid, hard, and tender to touch  Call your healthcare provider if you have:  · Pain for more than 5 days  · Bloating for more than  2 days  · Diarrhea for more than 5 days  · A fever of 100.4°F (38.0°C) or higher, or as directed by your provider  · Pain that gets worse  · Weight loss for no reason  · Continued lack of appetite  · Blood in your stool  How to prevent abdominal pain  Here are some tips to help prevent abdominal pain:  · Eat smaller amounts of food at one time.  · Avoid greasy, fried, or other high-fat foods.  · Avoid foods that give you gas.  · Exercise regularly.  · Drink plenty of fluids.  To help prevent GERD symptoms:  · Quit smoking.  · Reduce alcohol and certain foods that increase stomach acid.  · Avoid aspirin and over-the-counter pain and fever medicines (NSAIDS or nonsteroidal anti-inflammatory drugs), if possible  · Lose extra weight.  · Finish eating at least 2 hours before you go to bed or lie down.  · Raise the head of your bed.  Date Last Reviewed: 7/1/2016  © 7615-4691 [employer]. [address] [place] [address], [place] [address]. All rights reserved. This information is not intended as a substitute for professional medical care. Always follow your healthcare professional's instructions.

## 2021-01-04 ENCOUNTER — PATIENT MESSAGE (OUTPATIENT)
Dept: ADMINISTRATIVE | Facility: HOSPITAL | Age: 21
End: 2021-01-04

## 2021-04-05 ENCOUNTER — PATIENT MESSAGE (OUTPATIENT)
Dept: ADMINISTRATIVE | Facility: HOSPITAL | Age: 21
End: 2021-04-05

## 2021-07-06 ENCOUNTER — PATIENT MESSAGE (OUTPATIENT)
Dept: ADMINISTRATIVE | Facility: HOSPITAL | Age: 21
End: 2021-07-06

## 2021-08-11 ENCOUNTER — TELEPHONE (OUTPATIENT)
Dept: PEDIATRIC CARDIOLOGY | Facility: CLINIC | Age: 21
End: 2021-08-11

## 2021-08-11 DIAGNOSIS — Q24.9 CONGENITAL MALFORMATION OF HEART, UNSPECIFIED: ICD-10-CM

## 2021-08-11 DIAGNOSIS — Q87.40 MARFAN SYNDROME: ICD-10-CM

## 2021-08-11 DIAGNOSIS — I34.1 MITRAL VALVE PROLAPSE: Primary | ICD-10-CM

## 2021-08-11 DIAGNOSIS — Q24.9 ADULT CONGENITAL HEART DISEASE: ICD-10-CM

## 2021-10-07 ENCOUNTER — HOSPITAL ENCOUNTER (OUTPATIENT)
Dept: CARDIOLOGY | Facility: CLINIC | Age: 21
Discharge: HOME | End: 2021-10-07
Payer: COMMERCIAL

## 2021-10-07 ENCOUNTER — OFFICE VISIT (OUTPATIENT)
Dept: CARDIOLOGY | Facility: CLINIC | Age: 21
End: 2021-10-07
Payer: COMMERCIAL

## 2021-10-07 ENCOUNTER — HOSPITAL ENCOUNTER (OUTPATIENT)
Dept: RADIOLOGY | Facility: HOSPITAL | Age: 21
Discharge: HOME | End: 2021-10-07
Attending: PEDIATRICS
Payer: COMMERCIAL

## 2021-10-07 VITALS
DIASTOLIC BLOOD PRESSURE: 76 MMHG | OXYGEN SATURATION: 98 % | HEIGHT: 77 IN | SYSTOLIC BLOOD PRESSURE: 117 MMHG | BODY MASS INDEX: 24.18 KG/M2 | WEIGHT: 204.81 LBS | HEART RATE: 71 BPM

## 2021-10-07 DIAGNOSIS — Q24.9 CONGENITAL MALFORMATION OF HEART, UNSPECIFIED: ICD-10-CM

## 2021-10-07 DIAGNOSIS — I34.1 MITRAL VALVE PROLAPSE: ICD-10-CM

## 2021-10-07 DIAGNOSIS — Q24.9 ADULT CONGENITAL HEART DISEASE: ICD-10-CM

## 2021-10-07 DIAGNOSIS — I34.1 MITRAL VALVE PROLAPSE: Primary | ICD-10-CM

## 2021-10-07 DIAGNOSIS — Q87.40 MARFAN SYNDROME: ICD-10-CM

## 2021-10-07 PROCEDURE — 1159F MED LIST DOCD IN RCRD: CPT | Mod: CPTII,S$GLB,, | Performed by: PEDIATRICS

## 2021-10-07 PROCEDURE — 75561 CARDIAC MRI FOR MORPH W/DYE: CPT | Mod: TC

## 2021-10-07 PROCEDURE — 93005 ELECTROCARDIOGRAM TRACING: CPT | Mod: S$GLB,,, | Performed by: PEDIATRICS

## 2021-10-07 PROCEDURE — 3078F PR MOST RECENT DIASTOLIC BLOOD PRESSURE < 80 MM HG: ICD-10-PCS | Mod: CPTII,S$GLB,, | Performed by: PEDIATRICS

## 2021-10-07 PROCEDURE — 93010 EKG 12-LEAD: ICD-10-PCS | Mod: S$GLB,,, | Performed by: INTERNAL MEDICINE

## 2021-10-07 PROCEDURE — A9577 INJ MULTIHANCE: HCPCS | Performed by: PEDIATRICS

## 2021-10-07 PROCEDURE — 93010 ELECTROCARDIOGRAM REPORT: CPT | Mod: S$GLB,,, | Performed by: INTERNAL MEDICINE

## 2021-10-07 PROCEDURE — 25500020 PHARM REV CODE 255: Performed by: PEDIATRICS

## 2021-10-07 PROCEDURE — 1159F PR MEDICATION LIST DOCUMENTED IN MEDICAL RECORD: ICD-10-PCS | Mod: CPTII,S$GLB,, | Performed by: PEDIATRICS

## 2021-10-07 PROCEDURE — 75561 MRI CARDIAC MORPHOLOGY FUNCTION W WO: ICD-10-PCS | Mod: 26,,, | Performed by: RADIOLOGY

## 2021-10-07 PROCEDURE — 3008F PR BODY MASS INDEX (BMI) DOCUMENTED: ICD-10-PCS | Mod: CPTII,S$GLB,, | Performed by: PEDIATRICS

## 2021-10-07 PROCEDURE — 99999 PR PBB SHADOW E&M-EST. PATIENT-LVL III: CPT | Mod: PBBFAC,,, | Performed by: PEDIATRICS

## 2021-10-07 PROCEDURE — 3074F PR MOST RECENT SYSTOLIC BLOOD PRESSURE < 130 MM HG: ICD-10-PCS | Mod: CPTII,S$GLB,, | Performed by: PEDIATRICS

## 2021-10-07 PROCEDURE — 3008F BODY MASS INDEX DOCD: CPT | Mod: CPTII,S$GLB,, | Performed by: PEDIATRICS

## 2021-10-07 PROCEDURE — 3074F SYST BP LT 130 MM HG: CPT | Mod: CPTII,S$GLB,, | Performed by: PEDIATRICS

## 2021-10-07 PROCEDURE — 75561 CARDIAC MRI FOR MORPH W/DYE: CPT | Mod: 26,,, | Performed by: RADIOLOGY

## 2021-10-07 PROCEDURE — 99214 OFFICE O/P EST MOD 30 MIN: CPT | Mod: 25,S$GLB,, | Performed by: PEDIATRICS

## 2021-10-07 PROCEDURE — 93005 EKG 12-LEAD: ICD-10-PCS | Mod: S$GLB,,, | Performed by: PEDIATRICS

## 2021-10-07 PROCEDURE — 99214 PR OFFICE/OUTPT VISIT, EST, LEVL IV, 30-39 MIN: ICD-10-PCS | Mod: 25,S$GLB,, | Performed by: PEDIATRICS

## 2021-10-07 PROCEDURE — 3078F DIAST BP <80 MM HG: CPT | Mod: CPTII,S$GLB,, | Performed by: PEDIATRICS

## 2021-10-07 PROCEDURE — 99999 PR PBB SHADOW E&M-EST. PATIENT-LVL III: ICD-10-PCS | Mod: PBBFAC,,, | Performed by: PEDIATRICS

## 2021-10-07 RX ADMIN — GADOBENATE DIMEGLUMINE 40 ML: 529 INJECTION, SOLUTION INTRAVENOUS at 09:10

## 2022-12-27 ENCOUNTER — PATIENT MESSAGE (OUTPATIENT)
Dept: PEDIATRIC CARDIOLOGY | Facility: CLINIC | Age: 22
End: 2022-12-27

## 2022-12-27 DIAGNOSIS — Q87.40 MARFAN SYNDROME: ICD-10-CM

## 2022-12-27 DIAGNOSIS — I34.1 MITRAL VALVE PROLAPSE: ICD-10-CM

## 2022-12-27 DIAGNOSIS — Q24.9 ADULT CONGENITAL HEART DISEASE: Primary | ICD-10-CM

## 2023-03-08 ENCOUNTER — OFFICE VISIT (OUTPATIENT)
Dept: CARDIOLOGY | Facility: CLINIC | Age: 23
End: 2023-03-08
Attending: PEDIATRICS
Payer: COMMERCIAL

## 2023-03-08 ENCOUNTER — HOSPITAL ENCOUNTER (OUTPATIENT)
Dept: CARDIOLOGY | Facility: CLINIC | Age: 23
Discharge: HOME | End: 2023-03-08
Attending: PEDIATRICS
Payer: COMMERCIAL

## 2023-03-08 ENCOUNTER — HOSPITAL ENCOUNTER (OUTPATIENT)
Dept: CARDIOLOGY | Facility: HOSPITAL | Age: 23
Discharge: HOME | End: 2023-03-08
Attending: PEDIATRICS
Payer: COMMERCIAL

## 2023-03-08 VITALS
WEIGHT: 211.44 LBS | SYSTOLIC BLOOD PRESSURE: 124 MMHG | OXYGEN SATURATION: 96 % | BODY MASS INDEX: 24.46 KG/M2 | HEART RATE: 67 BPM | DIASTOLIC BLOOD PRESSURE: 69 MMHG | HEIGHT: 78 IN

## 2023-03-08 VITALS — HEIGHT: 77 IN | BODY MASS INDEX: 24.09 KG/M2 | WEIGHT: 204 LBS

## 2023-03-08 DIAGNOSIS — Q87.40 MARFAN SYNDROME: ICD-10-CM

## 2023-03-08 DIAGNOSIS — Z01.818 ENCOUNTER FOR OTHER PREPROCEDURAL EXAMINATION: Primary | ICD-10-CM

## 2023-03-08 DIAGNOSIS — Q24.9 ADULT CONGENITAL HEART DISEASE: ICD-10-CM

## 2023-03-08 DIAGNOSIS — I34.1 MITRAL VALVE PROLAPSE: ICD-10-CM

## 2023-03-08 LAB
ASCENDING AORTA: 2.75 CM
AV INDEX (PROSTH): 0.84
AV MEAN GRADIENT: 3 MMHG
AV PEAK GRADIENT: 6 MMHG
AV VALVE AREA: 4.51 CM2
AV VELOCITY RATIO: 0.83
BSA FOR ECHO PROCEDURE: 2.24 M2
CV ECHO LV RWT: 0.32 CM
DOP CALC AO PEAK VEL: 1.22 M/S
DOP CALC AO VTI: 24.41 CM
DOP CALC LVOT AREA: 5.4 CM2
DOP CALC LVOT DIAMETER: 2.62 CM
DOP CALC LVOT PEAK VEL: 1.01 M/S
DOP CALC LVOT STROKE VOLUME: 110.2 CM3
DOP CALC MV VTI: 24.74 CM
DOP CALC RVOT PEAK VEL: 0.72 M/S
DOP CALC RVOT VTI: 15.51 CM
DOP CALCLVOT PEAK VEL VTI: 20.45 CM
E WAVE DECELERATION TIME: 154.28 MSEC
E/A RATIO: 2.03
E/E' RATIO: 6.3 M/S
ECHO LV POSTERIOR WALL: 0.81 CM (ref 0.6–1.1)
EJECTION FRACTION: 60 %
FRACTIONAL SHORTENING: 32 % (ref 28–44)
INTERVENTRICULAR SEPTUM: 0.77 CM (ref 0.6–1.1)
IVRT: 94.2 MSEC
LA MAJOR: 5.27 CM
LA MINOR: 5.28 CM
LA WIDTH: 4.05 CM
LEFT ATRIUM SIZE: 2.93 CM
LEFT ATRIUM VOLUME INDEX MOD: 32 ML/M2
LEFT ATRIUM VOLUME INDEX: 23.5 ML/M2
LEFT ATRIUM VOLUME MOD: 72.34 CM3
LEFT ATRIUM VOLUME: 53.21 CM3
LEFT INTERNAL DIMENSION IN SYSTOLE: 3.5 CM (ref 2.1–4)
LEFT VENTRICLE DIASTOLIC VOLUME INDEX: 55.46 ML/M2
LEFT VENTRICLE DIASTOLIC VOLUME: 125.35 ML
LEFT VENTRICLE MASS INDEX: 62 G/M2
LEFT VENTRICLE SYSTOLIC VOLUME INDEX: 22.4 ML/M2
LEFT VENTRICLE SYSTOLIC VOLUME: 50.73 ML
LEFT VENTRICULAR INTERNAL DIMENSION IN DIASTOLE: 5.13 CM (ref 3.5–6)
LEFT VENTRICULAR MASS: 139.63 G
LV LATERAL E/E' RATIO: 6.3 M/S
LV SEPTAL E/E' RATIO: 6.3 M/S
MV A" WAVE DURATION": 12.56 MSEC
MV MEAN GRADIENT: 1 MMHG
MV PEAK A VEL: 0.31 M/S
MV PEAK E VEL: 0.63 M/S
MV PEAK GRADIENT: 2 MMHG
MV STENOSIS PRESSURE HALF TIME: 81.54 MS
MV VALVE AREA BY CONTINUITY EQUATION: 4.45 CM2
MV VALVE AREA P 1/2 METHOD: 2.7 CM2
PISA TR MAX VEL: 1.78 M/S
PULM VEIN S/D RATIO: 0.55
PV MEAN GRADIENT: 1.21 MMHG
PV PEAK D VEL: 0.67 M/S
PV PEAK S VEL: 0.37 M/S
PV PEAK VELOCITY: 0.9 CM/S
RA MAJOR: 4.99 CM
RA WIDTH: 4.3 CM
RIGHT VENTRICULAR END-DIASTOLIC DIMENSION: 4.61 CM
RV TISSUE DOPPLER FREE WALL SYSTOLIC VELOCITY 1 (APICAL 4 CHAMBER VIEW): 13.32 CM/S
SINUS: 3.82 CM
STJ: 2.78 CM
TDI LATERAL: 0.1 M/S
TDI SEPTAL: 0.1 M/S
TDI: 0.1 M/S
TR MAX PG: 13 MMHG
TRICUSPID ANNULAR PLANE SYSTOLIC EXCURSION: 2.21 CM

## 2023-03-08 PROCEDURE — 99999 PR PBB SHADOW E&M-EST. PATIENT-LVL IV: CPT | Mod: PBBFAC,,, | Performed by: PEDIATRICS

## 2023-03-08 PROCEDURE — 99214 OFFICE O/P EST MOD 30 MIN: CPT | Mod: 25,S$GLB,, | Performed by: PEDIATRICS

## 2023-03-08 PROCEDURE — 99999 PR PBB SHADOW E&M-EST. PATIENT-LVL IV: ICD-10-PCS | Mod: PBBFAC,,, | Performed by: PEDIATRICS

## 2023-03-08 PROCEDURE — 93010 ELECTROCARDIOGRAM REPORT: CPT | Mod: S$GLB,,, | Performed by: INTERNAL MEDICINE

## 2023-03-08 PROCEDURE — 3008F BODY MASS INDEX DOCD: CPT | Mod: CPTII,S$GLB,, | Performed by: PEDIATRICS

## 2023-03-08 PROCEDURE — 93320 ECHO (CUPID ONLY): ICD-10-PCS | Mod: 26,,, | Performed by: PEDIATRICS

## 2023-03-08 PROCEDURE — 3078F DIAST BP <80 MM HG: CPT | Mod: CPTII,S$GLB,, | Performed by: PEDIATRICS

## 2023-03-08 PROCEDURE — 93005 ELECTROCARDIOGRAM TRACING: CPT | Mod: S$GLB,,, | Performed by: PEDIATRICS

## 2023-03-08 PROCEDURE — 93320 DOPPLER ECHO COMPLETE: CPT | Mod: 26,,, | Performed by: PEDIATRICS

## 2023-03-08 PROCEDURE — 93325 ECHO (CUPID ONLY): ICD-10-PCS | Mod: 26,,, | Performed by: PEDIATRICS

## 2023-03-08 PROCEDURE — 93005 EKG 12-LEAD: ICD-10-PCS | Mod: S$GLB,,, | Performed by: PEDIATRICS

## 2023-03-08 PROCEDURE — 93010 EKG 12-LEAD: ICD-10-PCS | Mod: S$GLB,,, | Performed by: INTERNAL MEDICINE

## 2023-03-08 PROCEDURE — 1159F MED LIST DOCD IN RCRD: CPT | Mod: CPTII,S$GLB,, | Performed by: PEDIATRICS

## 2023-03-08 PROCEDURE — 99214 PR OFFICE/OUTPT VISIT, EST, LEVL IV, 30-39 MIN: ICD-10-PCS | Mod: 25,S$GLB,, | Performed by: PEDIATRICS

## 2023-03-08 PROCEDURE — 93303 ECHO TRANSTHORACIC: CPT | Mod: 26,,, | Performed by: PEDIATRICS

## 2023-03-08 PROCEDURE — 3074F PR MOST RECENT SYSTOLIC BLOOD PRESSURE < 130 MM HG: ICD-10-PCS | Mod: CPTII,S$GLB,, | Performed by: PEDIATRICS

## 2023-03-08 PROCEDURE — 3074F SYST BP LT 130 MM HG: CPT | Mod: CPTII,S$GLB,, | Performed by: PEDIATRICS

## 2023-03-08 PROCEDURE — 3078F PR MOST RECENT DIASTOLIC BLOOD PRESSURE < 80 MM HG: ICD-10-PCS | Mod: CPTII,S$GLB,, | Performed by: PEDIATRICS

## 2023-03-08 PROCEDURE — 3008F PR BODY MASS INDEX (BMI) DOCUMENTED: ICD-10-PCS | Mod: CPTII,S$GLB,, | Performed by: PEDIATRICS

## 2023-03-08 PROCEDURE — 1159F PR MEDICATION LIST DOCUMENTED IN MEDICAL RECORD: ICD-10-PCS | Mod: CPTII,S$GLB,, | Performed by: PEDIATRICS

## 2023-03-08 PROCEDURE — 93303 ECHO (CUPID ONLY): ICD-10-PCS | Mod: 26,,, | Performed by: PEDIATRICS

## 2023-03-08 PROCEDURE — 93325 DOPPLER ECHO COLOR FLOW MAPG: CPT

## 2023-03-08 PROCEDURE — 93325 DOPPLER ECHO COLOR FLOW MAPG: CPT | Mod: 26,,, | Performed by: PEDIATRICS

## 2023-03-08 NOTE — PROGRESS NOTES
2023    Re:Rodrigo Lopez  :2000    Jono Tesfaye, DO  [address] [facility] [place] [address]    [facility]    Dear Dr. Tesfaye:    It is a pleasure to see Rodrigo in follow up for his Marfan syndrome in the [facility].  The history was provided by the patient.  Rodrigo Lopez is a 22 y.o. male diagnosed via genetic testing with Marfan syndrome.  He is asymptomatic from a cardiovascular standpoint without chest pain, palpitations, syncope, near-syncope, cyanosis, and edema.  He is well aware of the activity restrictions placed on patients with Marfan's.  He has seen opthalmology, and his eyes are doing well.  He is tolerating his beta blocker, and he reports good compliance.    Still working primarily from home with [employer].    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  FBN1 was positive for C271X pathogenic mutation consistent with Marfan syndrome.    Past Surgical History:   Procedure Laterality Date    TYMPANOSTOMY TUBE PLACEMENT       Family History   Problem Relation Age of Onset    Diabetes Maternal Grandmother     Depression Maternal Grandmother     Short stature Brother     Depression Mother     Depression Maternal Aunt     Diabetes Maternal Aunt     Congenital heart disease Neg Hx     Cardiomyopathy Neg Hx    There is no family history of aortic dissection, sudden death, or Marfan syndrome.  Social History     Socioeconomic History    Marital status: Single   Occupational History    Occupation: Student at [employer]   Tobacco Use    Smoking status: Never    Smokeless tobacco: Never   Substance and Sexual Activity    Alcohol use: Yes     Alcohol/week: 1.0 standard drink     Types: 1 Cans of beer per week     Comment: social    Drug use: No    Sexual  "activity: Yes   Social History Narrative    Graduated college, working in [employer]       Current Outpatient Medications:     metoprolol succinate (TOPROL-XL) 50 MG 24 hr tablet, TAKE 1 TABLET(50 MG) BY MOUTH EVERY DAY, Disp: 30 tablet, Rfl: 6  Allergies   Allergen Reactions    Cashew Nut      The review of systems is as noted above. It is otherwise negative for other symptoms related to the general, neurological, psychiatric, endocrine, gastrointestinal, genitourinary, respiratory, dermatologic, musculoskeletal, hematologic, and immunologic systems.    /69 (BP Location: Left arm, Patient Position: Sitting, BP Method: Large (Automatic))   Pulse 67   Ht 6' 6.35" (1.99 m)   Wt 95.9 kg (211 lb 6.7 oz)   SpO2 96%   BMI 24.22 kg/m²   Wt Readings from Last 3 Encounters:   03/08/23 95.9 kg (211 lb 6.7 oz)   03/08/23 92.5 kg (204 lb)   10/07/21 92.9 kg (204 lb 12.9 oz)     Ht Readings from Last 3 Encounters:   03/08/23 6' 6.35" (1.99 m)   03/08/23 6' 5" (1.956 m)   10/07/21 6' 5" (1.956 m)     Body mass index is 24.22 kg/m².  [unfilled]  Facility age limit for growth percentiles is 20 years.  Facility age limit for growth percentiles is 20 years.  In general, he is a tall thin white male in no apparent distress.  The head is normocephalic and atraumatic.  The eyes and ears are clear.  There is no significant teeth crowding but he does have a somewhat high arched palate.  The neck is supple.  There is no jugular venous distention.  The lungs are clear to auscultation bilaterally.  There may be a very mild scoliosis.  He has a very mild pectus.  The first and second heart sounds are normal.  A mid systolic click is appreciated.  However, there are no murmurs in the supine or standing position.  The abdominal exam is benign without hepatosplenomegaly, tenderness, or distention.  Pulses are normal in all extremities with brisk capillary refill and no clubbing, cyanosis, or edema.  He has arachnodactyly.  He has very " long arms and legs.    I personally reviewed the following tests performed today and my interpretation follows:  EKG today is normal.  Rate of 59.    Results for orders placed during the hospital encounter of 03/08/23    Echo    Interpretation Summary  CONGENITAL CARDIAC HISTORY:  C271X pathogenic mutation consistent with Marfan syndrome.    SEGMENTAL CARDIAC CONNECTIONS (previously demonstrated):  Abdominal situs is solitus.  Atrial situs is normal.  Atrioventricular concordance.  Symmetric development of LV and RV.  Ventriculoarterial concordance.  Aortic valve is normal.  Pulmonic valve is normal.  Normal size confluent pulmonary arteries.  Ventricular loop: D-loop.  Cardiac position is Levocardia.  Normal size aorta with left aortic arch branching pattern.  No ventricular septal defect present.  No interatrial septal defect present.      IMPRESSION:  History of Marfan's disease with no obvious change compared to previous study-  Qualitatively normal right ventricular size structure with good systolic function.  Mildly redundant anterior leafet of the mitral valve with trivial prolapse and no evidence of hemodynamically significant insufficiency or stenosis.  Normal left ventricular size and structure.  Normal septal motion with good movement of the LV free wall, SF = 38% and EF estimated 60-65 % from apical views.  Normal indices of left ventricular diastolic function.  Aortic dimensions:  Sinuses of Valsalva = 38 mm.  ST junction             = 28 mm.  Ascending aorta     =  27 mm.  Normal size aorta with no evidence of coarctation.  No pericardial effusion.      Cardiac MRI 10/7/21:  Conclusion:  20 yo with Marfan syndrome  Normal RV volumes with RVEF of 46%  Minimal mitral valve prolapse with no significant insufficiency.   Normal LV volumes with LVEF of 53 %  Trileaflet aortic valve. Trivial aortic insufficiency with regurgitant fraction of 5%   Top normal size of aortic root (37mm) at the sinuses of  Valsalva, normal size of the ascending aorta (24mm).  The aortic root at the sinuses of Valsalva has increased in size when directly compared to MRI of 2017 (32mm).     He had a cardiac MRI December 19, 2017:  On angiogram:  a. Sinuses of valsalva: [id number] (Z-score 0.7 via [email or web address])  b. Sinotubular junction: 24x23 mm (Z-score -0.57 Josie data)  c. Ascending aorta at the level of the RPA: 93o30mm (Z-score -0.42 Josie data)  d. Ascending aorta prior the right brachiocephalic: 22x21 mm  e. Proximal transverse arch: 15q91hi  f. Distal transverse arch: 20k05ui  g. Descending aortic arch just after the left subclavian (isthmus):09d41qw  h. Descending aorta at the left PA: 23x17 mm  i. Descending aorta at the diaphragm: 18x16 mm    Conclusion:    1. Normal LV volumes with LVEF of 56%  2. Normal RV volumes with RVEF of 38%  3. Trileaflet AV without significant AI  4. Normal size aorta  (measurements as described above).   5. Normal mitral valve    Diagnoses:  1.  Genetically confirmed Marfan syndrome  2.  Mild Mitral valve prolapse with very mild insufficiency  3.  No significant aortic root enlargement (3.8cm at sinus of Valsalva with total growth of 6mm since 2017 MRI)   4.  Trivial aortic insufficiency     Discussion:  We once again had a long discussion about the potential cardiovascular problems associated with Marfan syndrome.  The most worrisome is the increased risk of progressive aortic root enlargement associated with aortic dissection and aortic rupture.  His aorta continues to look good.  However, secondary to published recommendations, we started his beta blocker a few years ago, and he has tolerated this well.  Any chest pain should be evaluated immediately.      His mitral valve prolapse is quite mild and there is no significant insufficiency.  I think it is unlikely that he will ever require intervention on the mitral valve.    Recommended against sprinting, heavy weight lifting.    We again had a  discussion about activity restriction related to Marfan syndrome.     Plan:  1.  As above  2.  No SBEP needed.  3.  Continue metoprolol 50 mg daily.  4.  Follow up 1 year with chest, abdominal and pelvic MRA along with EKG.    Thank you for referring this patient to our clinic.  Please call with any questions.    Sincerely,        Gianni Salazar MD  Pediatric Cardiology  Adult Congenital Heart Disease  Pediatric Heart Failure and Transplantation  [facility] [address] [place]  [address]  [phone]

## 2023-03-14 ENCOUNTER — PATIENT MESSAGE (OUTPATIENT)
Dept: CARDIOLOGY | Facility: CLINIC | Age: 23
End: 2023-03-14

## 2023-09-25 ENCOUNTER — PATIENT MESSAGE (OUTPATIENT)
Dept: CARDIOLOGY | Facility: CLINIC | Age: 23
End: 2023-09-25
Payer: COMMERCIAL

## 2023-11-09 ENCOUNTER — PATIENT MESSAGE (OUTPATIENT)
Dept: CARDIOLOGY | Facility: CLINIC | Age: 23
End: 2023-11-09
Payer: COMMERCIAL

## 2024-01-18 ENCOUNTER — PATIENT MESSAGE (OUTPATIENT)
Dept: CARDIOLOGY | Facility: CLINIC | Age: 24
End: 2024-01-18
Payer: COMMERCIAL

## 2024-01-23 DIAGNOSIS — Q24.9 ADULT CONGENITAL HEART DISEASE: ICD-10-CM

## 2024-01-23 DIAGNOSIS — Q87.40 MARFAN SYNDROME: ICD-10-CM

## 2024-01-23 DIAGNOSIS — I34.1 MITRAL VALVE PROLAPSE: Primary | ICD-10-CM

## 2024-02-29 ENCOUNTER — HOSPITAL ENCOUNTER (OUTPATIENT)
Dept: RADIOLOGY | Facility: HOSPITAL | Age: 24
Discharge: HOME | End: 2024-02-29
Attending: PEDIATRICS
Payer: COMMERCIAL

## 2024-02-29 ENCOUNTER — HOSPITAL ENCOUNTER (OUTPATIENT)
Dept: CARDIOLOGY | Facility: CLINIC | Age: 24
Discharge: HOME | End: 2024-02-29
Payer: COMMERCIAL

## 2024-02-29 ENCOUNTER — OFFICE VISIT (OUTPATIENT)
Dept: CARDIOLOGY | Facility: CLINIC | Age: 24
End: 2024-02-29
Attending: PEDIATRICS
Payer: COMMERCIAL

## 2024-02-29 VITALS
HEIGHT: 78 IN | HEART RATE: 62 BPM | DIASTOLIC BLOOD PRESSURE: 73 MMHG | OXYGEN SATURATION: 100 % | WEIGHT: 217.38 LBS | SYSTOLIC BLOOD PRESSURE: 128 MMHG | BODY MASS INDEX: 25.15 KG/M2

## 2024-02-29 DIAGNOSIS — Z01.818 ENCOUNTER FOR OTHER PREPROCEDURAL EXAMINATION: ICD-10-CM

## 2024-02-29 DIAGNOSIS — Q87.40 MARFAN SYNDROME: ICD-10-CM

## 2024-02-29 DIAGNOSIS — I34.1 MITRAL VALVE PROLAPSE: Primary | ICD-10-CM

## 2024-02-29 DIAGNOSIS — Q24.9 ADULT CONGENITAL HEART DISEASE: ICD-10-CM

## 2024-02-29 DIAGNOSIS — I34.1 MITRAL VALVE PROLAPSE: ICD-10-CM

## 2024-02-29 LAB
OHS QRS DURATION: 90 MS
OHS QTC CALCULATION: 398 MS

## 2024-02-29 PROCEDURE — 3008F BODY MASS INDEX DOCD: CPT | Mod: CPTII,S$GLB,, | Performed by: PEDIATRICS

## 2024-02-29 PROCEDURE — A9577 INJ MULTIHANCE: HCPCS | Performed by: PEDIATRICS

## 2024-02-29 PROCEDURE — 72198 MR ANGIO PELVIS W/O & W/DYE: CPT | Mod: TC

## 2024-02-29 PROCEDURE — 25500020 PHARM REV CODE 255: Performed by: PEDIATRICS

## 2024-02-29 PROCEDURE — 93010 ELECTROCARDIOGRAM REPORT: CPT | Mod: S$GLB,,, | Performed by: INTERNAL MEDICINE

## 2024-02-29 PROCEDURE — 93005 ELECTROCARDIOGRAM TRACING: CPT | Mod: S$GLB,,, | Performed by: PEDIATRICS

## 2024-02-29 PROCEDURE — 99214 OFFICE O/P EST MOD 30 MIN: CPT | Mod: S$GLB,,, | Performed by: PEDIATRICS

## 2024-02-29 PROCEDURE — 3074F SYST BP LT 130 MM HG: CPT | Mod: CPTII,S$GLB,, | Performed by: PEDIATRICS

## 2024-02-29 PROCEDURE — 1159F MED LIST DOCD IN RCRD: CPT | Mod: CPTII,S$GLB,, | Performed by: PEDIATRICS

## 2024-02-29 PROCEDURE — 3078F DIAST BP <80 MM HG: CPT | Mod: CPTII,S$GLB,, | Performed by: PEDIATRICS

## 2024-02-29 PROCEDURE — 74185 MRA ABD W OR W/O CNTRST: CPT | Mod: 26,,, | Performed by: RADIOLOGY

## 2024-02-29 PROCEDURE — 71555 MRI ANGIO CHEST W OR W/O DYE: CPT | Mod: TC

## 2024-02-29 PROCEDURE — 72198 MR ANGIO PELVIS W/O & W/DYE: CPT | Mod: 26,,, | Performed by: RADIOLOGY

## 2024-02-29 PROCEDURE — 74185 MRA ABD W OR W/O CNTRST: CPT | Mod: TC

## 2024-02-29 PROCEDURE — 71555 MRI ANGIO CHEST W OR W/O DYE: CPT | Mod: 26,,, | Performed by: RADIOLOGY

## 2024-02-29 PROCEDURE — 99999 PR PBB SHADOW E&M-EST. PATIENT-LVL III: CPT | Mod: PBBFAC,,, | Performed by: PEDIATRICS

## 2024-02-29 RX ADMIN — GADOBENATE DIMEGLUMINE 40 ML: 529 INJECTION, SOLUTION INTRAVENOUS at 07:02

## 2024-02-29 NOTE — PROGRESS NOTES
2024    Re:Rodrigo Lopez  :2000    Jono Tesfaye, DO   [facility] [place] [address]    [facility]    Dear Dr. Tesfaye:    It is a pleasure to see Rodrigo in follow up for his Marfan syndrome in the [facility].  The history was provided by the patient.  Rodrigo Lopez is a 23 y.o. male diagnosed via genetic testing with Marfan syndrome.  He is asymptomatic from a cardiovascular standpoint without chest pain, palpitations, syncope, near-syncope, cyanosis, and edema.  He is well aware of the activity restrictions placed on patients with Marfan's.  He has seen opthalmology, and his eyes are doing well.  He is tolerating his beta blocker, and he reports good compliance.    He is an  with [employer].    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  FBN1 was positive for C271X pathogenic mutation consistent with Marfan syndrome.    Past Surgical History:   Procedure Laterality Date    TYMPANOSTOMY TUBE PLACEMENT       Family History   Problem Relation Age of Onset    Diabetes Maternal Grandmother     Depression Maternal Grandmother     Short stature Brother     Depression Mother     Depression Maternal Aunt     Diabetes Maternal Aunt     Congenital heart disease Neg Hx     Cardiomyopathy Neg Hx    There is no family history of aortic dissection, sudden death, or Marfan syndrome.  Social History     Socioeconomic History    Marital status: Single   Occupational History    Occupation: Student at [employer]   Tobacco Use    Smoking status: Never    Smokeless tobacco: Never   Substance and Sexual Activity    Alcohol use: Yes     Alcohol/week: 1.0 standard drink of alcohol     Types: 1 Cans of beer per week     Comment: social    Drug use: No    Sexual activity:  "Yes   Social History Narrative    Graduated college, working in [employer]       Current Outpatient Medications:     metoprolol succinate (TOPROL-XL) 50 MG 24 hr tablet, Take 1 tablet (50 mg total) by mouth once daily., Disp: 90 tablet, Rfl: 4  No current facility-administered medications for this visit.  Allergies   Allergen Reactions    Cashew Nut      The review of systems is as noted above. It is otherwise negative for other symptoms related to the general, neurological, psychiatric, endocrine, gastrointestinal, genitourinary, respiratory, dermatologic, musculoskeletal, hematologic, and immunologic systems.    /73 (BP Location: Right arm, Patient Position: Sitting)   Pulse 62   Ht 6' 6.35" (1.99 m)   Wt 98.6 kg (217 lb 6 oz)   SpO2 100%   BMI 24.90 kg/m²   Wt Readings from Last 3 Encounters:   02/29/24 98.6 kg (217 lb 6 oz)   03/08/23 95.9 kg (211 lb 6.7 oz)   03/08/23 92.5 kg (204 lb)     Ht Readings from Last 3 Encounters:   02/29/24 6' 6.35" (1.99 m)   03/08/23 6' 6.35" (1.99 m)   03/08/23 6' 5" (1.956 m)     Body mass index is 24.9 kg/m².  [unfilled]  Facility age limit for growth %hipolito is 20 years.  Facility age limit for growth %hipolito is 20 years.  In general, he is a tall thin white male in no apparent distress.  The head is normocephalic and atraumatic.  The eyes and ears are clear.  There is no significant teeth crowding but he does have a somewhat high arched palate.  The neck is supple.  There is no jugular venous distention.  The lungs are clear to auscultation bilaterally.  There may be a very mild scoliosis.  He has a very mild pectus.  The first and second heart sounds are normal.  A mid systolic click is appreciated.  However, there are no murmurs in the supine or standing position.  The abdominal exam is benign without hepatosplenomegaly, tenderness, or distention.  Pulses are normal in all extremities with brisk capillary refill and no clubbing, cyanosis, or edema.  He has " arachnodactyly.  He has very long arms and legs.    I personally reviewed the following tests performed today and my interpretation follows:  EKG today is normal.  Rate of 54.    Chest/Abdomen/Pelvis MRI 2/29/24:  Thoracic aorta: Left aortic arch with normal branch pattern.  No evidence for dissection.  Sinus of Valsalva: 38 mm  Sinotubular junction: 27 mm  Ascending aorta: 24 mm at the level of the right pulmonary artery.  Transverse arch: 20 mm  Descending thoracic aorta at the diaphragm: 19 mm  Abdominal aorta: No evidence for dissection.  Suprarenal: 19 mm  Infrarenal: 17 mm  Limited views of surrounding soft tissues reveal punctate cysts in the liver.  Spleen is normal size.  Kidneys are homogeneous.  Impression:  Fusiform dilatation of the proximal ascending thoracic aorta as above.  No dissection.    Results for orders placed during the hospital encounter of 03/08/23    Echo    Interpretation Summary  CONGENITAL CARDIAC HISTORY:  C271X pathogenic mutation consistent with Marfan syndrome.    SEGMENTAL CARDIAC CONNECTIONS (previously demonstrated):  Abdominal situs is solitus.  Atrial situs is normal.  Atrioventricular concordance.  Symmetric development of LV and RV.  Ventriculoarterial concordance.  Aortic valve is normal.  Pulmonic valve is normal.  Normal size confluent pulmonary arteries.  Ventricular loop: D-loop.  Cardiac position is Levocardia.  Normal size aorta with left aortic arch branching pattern.  No ventricular septal defect present.  No interatrial septal defect present.      IMPRESSION:  History of Marfan's disease with no obvious change compared to previous study-  Qualitatively normal right ventricular size structure with good systolic function.  Mildly redundant anterior leafet of the mitral valve with trivial prolapse and no evidence of hemodynamically significant insufficiency or stenosis.  Normal left ventricular size and structure.  Normal septal motion with good movement of the LV free  wall, SF = 38% and EF estimated 60-65 % from apical views.  Normal indices of left ventricular diastolic function.  Aortic dimensions:  Sinuses of Valsalva = 38 mm.  ST junction             = 28 mm.  Ascending aorta     =  27 mm.  Normal size aorta with no evidence of coarctation.  No pericardial effusion.      Cardiac MRI 10/7/21:  Conclusion:  20 yo with Marfan syndrome  Normal RV volumes with RVEF of 46%  Minimal mitral valve prolapse with no significant insufficiency.   Normal LV volumes with LVEF of 53 %  Trileaflet aortic valve. Trivial aortic insufficiency with regurgitant fraction of 5%   Top normal size of aortic root (37mm) at the sinuses of Valsalva, normal size of the ascending aorta (24mm).  The aortic root at the sinuses of Valsalva has increased in size when directly compared to MRI of 2017 (32mm).     He had a cardiac MRI December 19, 2017:  On angiogram:  a. Sinuses of valsalva: [id number] (Z-score 0.7 via [email or web address])  b. Sinotubular junction: 24x23 mm (Z-score -0.57 Josie data)  c. Ascending aorta at the level of the RPA: 35a17nz (Z-score -0.42 Josie data)  d. Ascending aorta prior the right brachiocephalic: 22x21 mm  e. Proximal transverse arch: 76z54rt  f. Distal transverse arch: 24k78qg  g. Descending aortic arch just after the left subclavian (isthmus):89e09gr  h. Descending aorta at the left PA: 23x17 mm  i. Descending aorta at the diaphragm: 18x16 mm    Conclusion:    1. Normal LV volumes with LVEF of 56%  2. Normal RV volumes with RVEF of 38%  3. Trileaflet AV without significant AI  4. Normal size aorta  (measurements as described above).   5. Normal mitral valve    Diagnoses:  1.  Genetically confirmed Marfan syndrome  2.  Mild Mitral valve prolapse with very mild insufficiency  3.  No significant aortic root enlargement (3.8 cm at sinus of Valsalva with total growth of 1 mm since 2021 MRI)    - rest of aorta normal  4.  Trivial aortic insufficiency     Discussion:  We once again had a  long discussion about the potential cardiovascular problems associated with Marfan syndrome.  The most worrisome is the increased risk of progressive aortic root enlargement associated with aortic dissection and aortic rupture.  His aorta continues to look good.  However, secondary to published recommendations, we started his beta blocker 4 years ago, and he has tolerated this well.  I offered to switch to losartan, but he has no issues with the metoprolol, so we will continue that.  Any chest pain should be evaluated immediately.      His mitral valve prolapse is quite mild and there is no significant insufficiency.  I think it is unlikely that he will ever require intervention on the mitral valve.    Recommended against sprinting, heavy weight lifting.    We again had a discussion about activity restriction related to Marfan syndrome.     Plan:  1.  As above  2.  No SBEP needed.  3.  Continue metoprolol 50 mg daily.  4.  Follow up 1 year with echo, ekg.  Likely repeat MRI in 2026.    Thank you for referring this patient to our clinic.  Please call with any questions.    Sincerely,        Gianni Salazar MD  Pediatric Cardiology  Adult Congenital Heart Disease  Pediatric Heart Failure and Transplantation  [facility] [address] [place]  [address]  [phone]

## 2024-06-10 ENCOUNTER — OFFICE VISIT (OUTPATIENT)
Dept: INTERNAL MEDICINE | Facility: CLINIC | Age: 24
End: 2024-06-10
Payer: COMMERCIAL

## 2024-06-10 VITALS
BODY MASS INDEX: 24.15 KG/M2 | HEART RATE: 62 BPM | HEIGHT: 77 IN | SYSTOLIC BLOOD PRESSURE: 120 MMHG | OXYGEN SATURATION: 97 % | WEIGHT: 204.5 LBS | DIASTOLIC BLOOD PRESSURE: 82 MMHG | TEMPERATURE: 98 F | RESPIRATION RATE: 18 BRPM

## 2024-06-10 DIAGNOSIS — Z00.00 ANNUAL PHYSICAL EXAM: Primary | ICD-10-CM

## 2024-06-10 DIAGNOSIS — I34.1 MITRAL VALVE PROLAPSE: ICD-10-CM

## 2024-06-10 DIAGNOSIS — Q24.9 ADULT CONGENITAL HEART DISEASE: ICD-10-CM

## 2024-06-10 DIAGNOSIS — Q87.40 MARFAN SYNDROME: ICD-10-CM

## 2024-06-10 PROCEDURE — 3074F SYST BP LT 130 MM HG: CPT | Mod: CPTII,S$GLB,, | Performed by: INTERNAL MEDICINE

## 2024-06-10 PROCEDURE — 99999 PR PBB SHADOW E&M-EST. PATIENT-LVL III: CPT | Mod: PBBFAC,,, | Performed by: INTERNAL MEDICINE

## 2024-06-10 PROCEDURE — 99385 PREV VISIT NEW AGE 18-39: CPT | Mod: S$GLB,,, | Performed by: INTERNAL MEDICINE

## 2024-06-10 PROCEDURE — 1159F MED LIST DOCD IN RCRD: CPT | Mod: CPTII,S$GLB,, | Performed by: INTERNAL MEDICINE

## 2024-06-10 PROCEDURE — 3008F BODY MASS INDEX DOCD: CPT | Mod: CPTII,S$GLB,, | Performed by: INTERNAL MEDICINE

## 2024-06-10 PROCEDURE — 1160F RVW MEDS BY RX/DR IN RCRD: CPT | Mod: CPTII,S$GLB,, | Performed by: INTERNAL MEDICINE

## 2024-06-10 PROCEDURE — 3079F DIAST BP 80-89 MM HG: CPT | Mod: CPTII,S$GLB,, | Performed by: INTERNAL MEDICINE

## 2024-06-10 NOTE — PROGRESS NOTES
Subjective     Patient ID: Rodrigo Lopez is a 23 y.o. male.    Chief Complaint: Annual Exam (Pt is non fasting for labs )    HPI  23 y.o. Male here for annual exam.      Vaccines: Influenza (declined); Tetanus (2011)  Sexual Screening: declined  Eye exam: 2017     Exercise: cardio/weights 6x/wk  Diet: regular    Past Medical History:  No date: Arachnodactyly  No date: Congenital heart problem  No date: Marfan syndrome  No date: MVP (mitral valve prolapse)  No date: Osteochondritis dessicans  No date: Pectus carinatum  No date: Scoliosis  Past Surgical History:  No date: TYMPANOSTOMY TUBE PLACEMENT  Social History    Socioeconomic History      Marital status: Single    Occupational History      Occupation: Consulting work     Tobacco Use      Smoking status: Never      Smokeless tobacco: Never    Substance and Sexual Activity      Alcohol use: Yes        Alcohol/week: 1.0 standard drink of alcohol        Types: 1 Cans of beer per week        Comment: social      Drug use: No      Sexual activity: Yes    Social History Narrative    Review of patient's allergies indicates:   -- Pistachio nut -- Swelling   -- Cashew nut   Rodrigo Lopez had no medications administered during this visit.  Review of Systems   Constitutional:  Negative for activity change, appetite change, chills, diaphoresis, fatigue, fever and unexpected weight change.   HENT:  Negative for nasal congestion, mouth sores, postnasal drip, rhinorrhea, sinus pressure/congestion, sneezing, sore throat, trouble swallowing and voice change.    Eyes:  Negative for discharge, itching and visual disturbance.   Respiratory:  Negative for cough, chest tightness, shortness of breath and wheezing.    Cardiovascular:  Negative for chest pain, palpitations and leg swelling.   Gastrointestinal:  Negative for abdominal pain, blood in stool, constipation, diarrhea, nausea and vomiting.   Endocrine: Negative for cold intolerance and heat intolerance.   Genitourinary:   Negative for difficulty urinating, dysuria, flank pain, hematuria and urgency.   Musculoskeletal:  Negative for arthralgias, back pain, myalgias and neck pain.   Integumentary:  Negative for rash and wound.   Allergic/Immunologic: Negative for environmental allergies and food allergies.   Neurological:  Negative for dizziness, tremors, seizures, syncope, weakness and headaches.   Hematological:  Negative for adenopathy. Does not bruise/bleed easily.   Psychiatric/Behavioral:  Negative for confusion, sleep disturbance and suicidal ideas. The patient is not nervous/anxious.           Objective     Physical Exam  Constitutional:       General: He is not in acute distress.     Appearance: Normal appearance. He is well-developed. He is not ill-appearing, toxic-appearing or diaphoretic.   HENT:      Head: Normocephalic and atraumatic.      Right Ear: External ear normal.      Left Ear: External ear normal.      Nose: Nose normal.      Mouth/Throat:      Pharynx: No oropharyngeal exudate.   Eyes:      General: No scleral icterus.        Right eye: No discharge.         Left eye: No discharge.      Extraocular Movements: Extraocular movements intact.      Conjunctiva/sclera: Conjunctivae normal.      Pupils: Pupils are equal, round, and reactive to light.   Neck:      Thyroid: No thyromegaly.      Vascular: No JVD.   Cardiovascular:      Rate and Rhythm: Normal rate and regular rhythm.      Pulses: Normal pulses.      Heart sounds: Normal heart sounds. No murmur heard.  Pulmonary:      Effort: Pulmonary effort is normal. No respiratory distress.      Breath sounds: Normal breath sounds. No wheezing or rales.   Abdominal:      General: Bowel sounds are normal. There is no distension.      Palpations: Abdomen is soft.      Tenderness: There is no abdominal tenderness. There is no right CVA tenderness, left CVA tenderness, guarding or rebound.   Musculoskeletal:      Cervical back: Normal range of motion and neck supple. No  rigidity.      Right lower leg: No edema.      Left lower leg: No edema.   Lymphadenopathy:      Cervical: No cervical adenopathy.   Skin:     General: Skin is warm and dry.      Capillary Refill: Capillary refill takes less than 2 seconds.      Coloration: Skin is not pale.      Findings: No rash.   Neurological:      General: No focal deficit present.      Mental Status: He is alert and oriented to person, place, and time. Mental status is at baseline.      Cranial Nerves: No cranial nerve deficit.      Sensory: No sensory deficit.      Motor: No weakness.      Coordination: Coordination normal.      Gait: Gait normal.      Deep Tendon Reflexes: Reflexes normal.   Psychiatric:         Mood and Affect: Mood normal.         Behavior: Behavior normal.         Thought Content: Thought content normal.         Judgment: Judgment normal.            Assessment and Plan     1. Annual physical exam  -     CBC Auto Differential; Future; Expected date: 06/10/2024  -     Comprehensive Metabolic Panel; Future; Expected date: 06/10/2024  -     TSH; Future; Expected date: 06/10/2024  -     Lipid Panel; Future; Expected date: 06/10/2024  -     Urinalysis; Future  -     Hemoglobin A1C; Future; Expected date: 06/10/2024    2. Adult congenital heart disease    3. Marfan syndrome    4. Mitral valve prolapse        Blood work ordered     Marfan syndrome- stable    Adult congenital heart disease/MVP- stable, followed by Cardio    F/u in 1 yr

## 2024-06-17 ENCOUNTER — LAB VISIT (OUTPATIENT)
Dept: LAB | Facility: HOSPITAL | Age: 24
End: 2024-06-17
Attending: INTERNAL MEDICINE
Payer: COMMERCIAL

## 2024-06-17 DIAGNOSIS — Z00.00 ANNUAL PHYSICAL EXAM: ICD-10-CM

## 2024-06-17 LAB
ALBUMIN SERPL BCP-MCNC: 4.1 G/DL (ref 3.5–5.2)
ALP SERPL-CCNC: 70 U/L (ref 55–135)
ALT SERPL W/O P-5'-P-CCNC: 17 U/L (ref 10–44)
ANION GAP SERPL CALC-SCNC: 10 MMOL/L (ref 8–16)
AST SERPL-CCNC: 19 U/L (ref 10–40)
BASOPHILS # BLD AUTO: 0.02 K/UL (ref 0–0.2)
BASOPHILS NFR BLD: 0.3 % (ref 0–1.9)
BILIRUB SERPL-MCNC: 0.8 MG/DL (ref 0.1–1)
BUN SERPL-MCNC: 15 MG/DL (ref 6–20)
CALCIUM SERPL-MCNC: 9.9 MG/DL (ref 8.7–10.5)
CHLORIDE SERPL-SCNC: 105 MMOL/L (ref 95–110)
CHOLEST SERPL-MCNC: 175 MG/DL (ref 120–199)
CHOLEST/HDLC SERPL: 3.4 {RATIO} (ref 2–5)
CO2 SERPL-SCNC: 24 MMOL/L (ref 23–29)
CREAT SERPL-MCNC: 1.1 MG/DL (ref 0.5–1.4)
DIFFERENTIAL METHOD BLD: NORMAL
EOSINOPHIL # BLD AUTO: 0.2 K/UL (ref 0–0.5)
EOSINOPHIL NFR BLD: 3.3 % (ref 0–8)
ERYTHROCYTE [DISTWIDTH] IN BLOOD BY AUTOMATED COUNT: 12.9 % (ref 11.5–14.5)
EST. GFR  (NO RACE VARIABLE): >60 ML/MIN/1.73 M^2
ESTIMATED AVG GLUCOSE: 100 MG/DL (ref 68–131)
GLUCOSE SERPL-MCNC: 96 MG/DL (ref 70–110)
HBA1C MFR BLD: 5.1 % (ref 4–5.6)
HCT VFR BLD AUTO: 47.1 % (ref 40–54)
HDLC SERPL-MCNC: 51 MG/DL (ref 40–75)
HDLC SERPL: 29.1 % (ref 20–50)
HGB BLD-MCNC: 15.1 G/DL (ref 14–18)
IMM GRANULOCYTES # BLD AUTO: 0.01 K/UL (ref 0–0.04)
IMM GRANULOCYTES NFR BLD AUTO: 0.2 % (ref 0–0.5)
LDLC SERPL CALC-MCNC: 104.6 MG/DL (ref 63–159)
LYMPHOCYTES # BLD AUTO: 3 K/UL (ref 1–4.8)
LYMPHOCYTES NFR BLD: 44.9 % (ref 18–48)
MCH RBC QN AUTO: 29.7 PG (ref 27–31)
MCHC RBC AUTO-ENTMCNC: 32.1 G/DL (ref 32–36)
MCV RBC AUTO: 93 FL (ref 82–98)
MONOCYTES # BLD AUTO: 0.4 K/UL (ref 0.3–1)
MONOCYTES NFR BLD: 6.2 % (ref 4–15)
NEUTROPHILS # BLD AUTO: 3 K/UL (ref 1.8–7.7)
NEUTROPHILS NFR BLD: 45.1 % (ref 38–73)
NONHDLC SERPL-MCNC: 124 MG/DL
NRBC BLD-RTO: 0 /100 WBC
PLATELET # BLD AUTO: 271 K/UL (ref 150–450)
PMV BLD AUTO: 11.1 FL (ref 9.2–12.9)
POTASSIUM SERPL-SCNC: 5.3 MMOL/L (ref 3.5–5.1)
PROT SERPL-MCNC: 7 G/DL (ref 6–8.4)
RBC # BLD AUTO: 5.09 M/UL (ref 4.6–6.2)
SODIUM SERPL-SCNC: 139 MMOL/L (ref 136–145)
TRIGL SERPL-MCNC: 97 MG/DL (ref 30–150)
TSH SERPL DL<=0.005 MIU/L-ACNC: 1.18 UIU/ML (ref 0.4–4)
WBC # BLD AUTO: 6.62 K/UL (ref 3.9–12.7)

## 2024-06-17 PROCEDURE — 80061 LIPID PANEL: CPT | Performed by: INTERNAL MEDICINE

## 2024-06-17 PROCEDURE — 84443 ASSAY THYROID STIM HORMONE: CPT | Performed by: INTERNAL MEDICINE

## 2024-06-17 PROCEDURE — 85025 COMPLETE CBC W/AUTO DIFF WBC: CPT | Performed by: INTERNAL MEDICINE

## 2024-06-17 PROCEDURE — 83036 HEMOGLOBIN GLYCOSYLATED A1C: CPT | Performed by: INTERNAL MEDICINE

## 2024-06-17 PROCEDURE — 80053 COMPREHEN METABOLIC PANEL: CPT | Performed by: INTERNAL MEDICINE

## 2024-06-17 PROCEDURE — 36415 COLL VENOUS BLD VENIPUNCTURE: CPT | Mod: PO | Performed by: INTERNAL MEDICINE

## 2024-10-01 ENCOUNTER — PATIENT MESSAGE (OUTPATIENT)
Dept: INTERNAL MEDICINE | Facility: CLINIC | Age: 24
End: 2024-10-01
Payer: COMMERCIAL

## 2025-02-18 ENCOUNTER — PATIENT MESSAGE (OUTPATIENT)
Dept: CARDIOLOGY | Facility: CLINIC | Age: 25
End: 2025-02-18
Payer: COMMERCIAL

## 2025-02-19 DIAGNOSIS — Q24.9 ADULT CONGENITAL HEART DISEASE: Primary | ICD-10-CM

## 2025-02-19 DIAGNOSIS — Q87.40 MARFAN SYNDROME: ICD-10-CM

## 2025-02-19 DIAGNOSIS — I34.1 MITRAL VALVE PROLAPSE: ICD-10-CM

## 2025-03-10 RX ORDER — METOPROLOL SUCCINATE 50 MG/1
50 TABLET, EXTENDED RELEASE ORAL DAILY
Qty: 90 TABLET | Refills: 0 | Status: SHIPPED | OUTPATIENT
Start: 2025-03-10